# Patient Record
Sex: MALE | Race: BLACK OR AFRICAN AMERICAN | NOT HISPANIC OR LATINO | Employment: UNEMPLOYED | URBAN - METROPOLITAN AREA
[De-identification: names, ages, dates, MRNs, and addresses within clinical notes are randomized per-mention and may not be internally consistent; named-entity substitution may affect disease eponyms.]

---

## 2017-02-20 PROBLEM — M1A.9XX0 CHRONIC GOUT: Status: ACTIVE | Noted: 2017-02-20

## 2017-02-20 PROBLEM — N18.6 ESRD (END STAGE RENAL DISEASE): Status: ACTIVE | Noted: 2017-02-20

## 2017-02-20 PROBLEM — L03.211 FACIAL CELLULITIS: Status: ACTIVE | Noted: 2017-02-20

## 2017-02-20 PROBLEM — Z95.0 HISTORY OF PACEMAKER: Status: ACTIVE | Noted: 2017-02-20

## 2017-02-23 PROBLEM — N25.81 SECONDARY HYPERPARATHYROIDISM: Status: ACTIVE | Noted: 2017-02-23

## 2017-02-23 PROBLEM — Z91.199 NONCOMPLIANCE: Status: ACTIVE | Noted: 2017-02-23

## 2017-02-23 PROBLEM — F03.90 DEMENTIA: Status: ACTIVE | Noted: 2017-02-23

## 2017-02-25 PROBLEM — R41.0 DELIRIUM: Status: ACTIVE | Noted: 2017-02-25

## 2017-02-25 PROBLEM — N18.6 END STAGE RENAL DISEASE: Status: ACTIVE | Noted: 2017-02-25

## 2017-02-25 PROBLEM — Z91.89 AT RISK FOR INJURY: Status: ACTIVE | Noted: 2017-02-25

## 2018-05-14 ENCOUNTER — HOSPITAL ENCOUNTER (INPATIENT)
Facility: HOSPITAL | Age: 83
LOS: 6 days | Discharge: NURSING, REHAB OR LONG-TERM CARE | DRG: 177 | End: 2018-05-21
Attending: EMERGENCY MEDICINE | Admitting: INTERNAL MEDICINE
Payer: MEDICARE

## 2018-05-14 DIAGNOSIS — Z91.89 AT RISK FOR INJURY: ICD-10-CM

## 2018-05-14 DIAGNOSIS — Z95.0 HISTORY OF PACEMAKER: ICD-10-CM

## 2018-05-14 DIAGNOSIS — G30.9 ALZHEIMER'S DEMENTIA WITHOUT BEHAVIORAL DISTURBANCE, UNSPECIFIED TIMING OF DEMENTIA ONSET: ICD-10-CM

## 2018-05-14 DIAGNOSIS — Z91.199 NONCOMPLIANCE: ICD-10-CM

## 2018-05-14 DIAGNOSIS — N25.81 SECONDARY HYPERPARATHYROIDISM: ICD-10-CM

## 2018-05-14 DIAGNOSIS — N18.6 ESRD (END STAGE RENAL DISEASE): ICD-10-CM

## 2018-05-14 DIAGNOSIS — R41.0 CONFUSION: ICD-10-CM

## 2018-05-14 DIAGNOSIS — F02.80 ALZHEIMER'S DEMENTIA WITHOUT BEHAVIORAL DISTURBANCE, UNSPECIFIED TIMING OF DEMENTIA ONSET: ICD-10-CM

## 2018-05-14 DIAGNOSIS — N18.6 END STAGE RENAL DISEASE: Primary | ICD-10-CM

## 2018-05-14 DIAGNOSIS — R41.0 DELIRIUM: ICD-10-CM

## 2018-05-14 LAB
ALBUMIN SERPL BCP-MCNC: 3.5 G/DL
ALP SERPL-CCNC: 114 U/L
ALT SERPL W/O P-5'-P-CCNC: 20 U/L
AMMONIA PLAS-SCNC: 29 UMOL/L
ANION GAP SERPL CALC-SCNC: 16 MMOL/L
AST SERPL-CCNC: 13 U/L
BASOPHILS # BLD AUTO: 0 K/UL
BASOPHILS NFR BLD: 0.2 %
BILIRUB SERPL-MCNC: 0.6 MG/DL
BUN SERPL-MCNC: 56 MG/DL
CALCIUM SERPL-MCNC: 9.9 MG/DL
CHLORIDE SERPL-SCNC: 102 MMOL/L
CO2 SERPL-SCNC: 24 MMOL/L
CREAT SERPL-MCNC: 11.6 MG/DL
DIFFERENTIAL METHOD: ABNORMAL
EOSINOPHIL # BLD AUTO: 0.2 K/UL
EOSINOPHIL NFR BLD: 3.2 %
ERYTHROCYTE [DISTWIDTH] IN BLOOD BY AUTOMATED COUNT: 15.7 %
EST. GFR  (AFRICAN AMERICAN): 4 ML/MIN/1.73 M^2
EST. GFR  (NON AFRICAN AMERICAN): 4 ML/MIN/1.73 M^2
GLUCOSE SERPL-MCNC: 100 MG/DL
HCT VFR BLD AUTO: 31 %
HGB BLD-MCNC: 10.2 G/DL
LYMPHOCYTES # BLD AUTO: 1.8 K/UL
LYMPHOCYTES NFR BLD: 33.2 %
MCH RBC QN AUTO: 28.2 PG
MCHC RBC AUTO-ENTMCNC: 32.9 G/DL
MCV RBC AUTO: 86 FL
MONOCYTES # BLD AUTO: 0.7 K/UL
MONOCYTES NFR BLD: 13.8 %
NEUTROPHILS # BLD AUTO: 2.7 K/UL
NEUTROPHILS NFR BLD: 49.6 %
PLATELET # BLD AUTO: 171 K/UL
PMV BLD AUTO: 7.3 FL
POTASSIUM SERPL-SCNC: 5.1 MMOL/L
PROT SERPL-MCNC: 7.9 G/DL
RBC # BLD AUTO: 3.62 M/UL
SODIUM SERPL-SCNC: 142 MMOL/L
TSH SERPL DL<=0.005 MIU/L-ACNC: 1.03 UIU/ML
WBC # BLD AUTO: 5.4 K/UL

## 2018-05-14 PROCEDURE — 80053 COMPREHEN METABOLIC PANEL: CPT

## 2018-05-14 PROCEDURE — 93005 ELECTROCARDIOGRAM TRACING: CPT

## 2018-05-14 PROCEDURE — 82140 ASSAY OF AMMONIA: CPT

## 2018-05-14 PROCEDURE — 84443 ASSAY THYROID STIM HORMONE: CPT

## 2018-05-14 PROCEDURE — 85025 COMPLETE CBC W/AUTO DIFF WBC: CPT

## 2018-05-14 PROCEDURE — 87040 BLOOD CULTURE FOR BACTERIA: CPT

## 2018-05-14 PROCEDURE — 99219 PR INITIAL OBSERVATION CARE,LEVL II: CPT | Mod: AI,,, | Performed by: INTERNAL MEDICINE

## 2018-05-14 PROCEDURE — 93010 ELECTROCARDIOGRAM REPORT: CPT | Mod: ,,, | Performed by: INTERNAL MEDICINE

## 2018-05-14 PROCEDURE — 63600175 PHARM REV CODE 636 W HCPCS: Performed by: HOSPITALIST

## 2018-05-14 PROCEDURE — 99285 EMERGENCY DEPT VISIT HI MDM: CPT | Mod: 25

## 2018-05-14 PROCEDURE — 36415 COLL VENOUS BLD VENIPUNCTURE: CPT

## 2018-05-14 PROCEDURE — 25000003 PHARM REV CODE 250: Performed by: HOSPITALIST

## 2018-05-14 PROCEDURE — 94760 N-INVAS EAR/PLS OXIMETRY 1: CPT

## 2018-05-14 PROCEDURE — 12000002 HC ACUTE/MED SURGE SEMI-PRIVATE ROOM

## 2018-05-14 PROCEDURE — 63600175 PHARM REV CODE 636 W HCPCS: Performed by: INTERNAL MEDICINE

## 2018-05-14 RX ORDER — SEVELAMER CARBONATE 800 MG/1
800 TABLET, FILM COATED ORAL
Status: DISCONTINUED | OUTPATIENT
Start: 2018-05-15 | End: 2018-05-21 | Stop reason: HOSPADM

## 2018-05-14 RX ORDER — ONDANSETRON 2 MG/ML
4 INJECTION INTRAMUSCULAR; INTRAVENOUS EVERY 8 HOURS PRN
Status: DISCONTINUED | OUTPATIENT
Start: 2018-05-14 | End: 2018-05-21 | Stop reason: HOSPADM

## 2018-05-14 RX ORDER — AMLODIPINE BESYLATE 5 MG/1
5 TABLET ORAL DAILY
Status: ON HOLD | COMMUNITY
Start: 2018-05-08 | End: 2018-05-21 | Stop reason: HOSPADM

## 2018-05-14 RX ORDER — HEPARIN SODIUM 5000 [USP'U]/ML
5000 INJECTION, SOLUTION INTRAVENOUS; SUBCUTANEOUS EVERY 12 HOURS
Status: DISCONTINUED | OUTPATIENT
Start: 2018-05-14 | End: 2018-05-21 | Stop reason: HOSPADM

## 2018-05-14 RX ORDER — HYDRALAZINE HYDROCHLORIDE 20 MG/ML
10 INJECTION INTRAMUSCULAR; INTRAVENOUS EVERY 8 HOURS PRN
Status: DISCONTINUED | OUTPATIENT
Start: 2018-05-14 | End: 2018-05-21 | Stop reason: HOSPADM

## 2018-05-14 RX ORDER — AMOXICILLIN 250 MG
1 CAPSULE ORAL DAILY PRN
Status: DISCONTINUED | OUTPATIENT
Start: 2018-05-14 | End: 2018-05-21 | Stop reason: HOSPADM

## 2018-05-14 RX ORDER — AMLODIPINE BESYLATE 5 MG/1
5 TABLET ORAL DAILY
Status: DISCONTINUED | OUTPATIENT
Start: 2018-05-14 | End: 2018-05-19

## 2018-05-14 RX ORDER — ACETAMINOPHEN 325 MG/1
650 TABLET ORAL EVERY 6 HOURS PRN
Status: DISCONTINUED | OUTPATIENT
Start: 2018-05-14 | End: 2018-05-21 | Stop reason: HOSPADM

## 2018-05-14 RX ORDER — AMLODIPINE BESYLATE 5 MG/1
5 TABLET ORAL DAILY
Status: DISCONTINUED | OUTPATIENT
Start: 2018-05-15 | End: 2018-05-14

## 2018-05-14 RX ADMIN — HEPARIN SODIUM 5000 UNITS: 5000 INJECTION, SOLUTION INTRAVENOUS; SUBCUTANEOUS at 09:05

## 2018-05-14 RX ADMIN — HYDRALAZINE HYDROCHLORIDE 10 MG: 20 INJECTION INTRAMUSCULAR; INTRAVENOUS at 11:05

## 2018-05-14 RX ADMIN — AMLODIPINE BESYLATE 5 MG: 5 TABLET ORAL at 09:05

## 2018-05-14 NOTE — H&P
"PCP: Carlos Saucedo MD    History & Physical    Chief Complaint: Confusion    History of Present Illness:  Patient is a 84 y.o. male admitted to Hospitalist Service from [facility] Emergency Room with complaint of "confusion". Patient reportedly has past medical history significant for ESRD (on HD M/W/F), dementia and hypertension. Part of the history obtained from patient's son. He confirmed frequent son downing episodes as well. Patient lives alone. Patient presented with complaint of altered mental status. Patient received a call from his dialysis nurse after missing a treatment today. Nurse reportedly called EMS because patient informed her that he was too weak to get out of bed. Patient's last treatment was on Friday. Patient denied chest pain, shortness of breath, abdominal pain, nausea, vomiting, headache, vision changes, focal neuro-deficits, cough or fever. Patient is pleasantly confused. No acute distress.    Past Medical History:   Diagnosis Date    Renal disorder      History reviewed. No pertinent surgical history.  History reviewed. No pertinent family history.  Social History   Substance Use Topics    Smoking status: Never Smoker    Smokeless tobacco: Not on file    Alcohol use No      Review of patient's allergies indicates:  No Known Allergies    (Not in a hospital admission)  Review of Systems:  Constitutional: no fever or chills  Eyes: no visual changes  Ears, nose, mouth, throat, and face: no nasal congestion or sore throat  Respiratory: no cough or shorness of breath  Cardiovascular: no chest pain or palpitations  Gastrointestinal: no nausea or vomiting, no abdominal pain or change in bowel habits  Genitourinary: no hematuria or dysuria  Integument/breast: no rash or pruritis  Hematologic/lymphatic: no easy bruising or lymphadenopathy  Musculoskeletal: no arthralgias or myalgias  Neurological: no seizures or tremors.  Behavioral/Psych: + Confusion  Endocrine: no heat or cold " intolerance     OBJECTIVE:     Vital Signs (Most Recent)  Temp: 98 °F (36.7 °C) (05/14/18 1526)  Pulse: 78 (05/14/18 1702)  Resp: 18 (05/14/18 1526)  BP: (!) 180/88 (05/14/18 1702)  SpO2: 95 % (05/14/18 1702)    Physical Exam:  General appearance: well developed, appears stated age  Head: normocephalic, atraumatic  Eyes:  conjunctivae/corneas clear. PERRL.  Nose: Nares normal. Septum midline.  Throat: lips, mucosa, and tongue normal; teeth and gums normal, no throat erythema.  Neck: supple, symmetrical, trachea midline, no JVD and thyroid not enlarged, symmetric, no tenderness/mass/nodules  Lungs:  clear to auscultation bilaterally and normal respiratory effort  Chest wall: no tenderness. Right sided PM.  Heart: regular rate and rhythm, S1, S2 normal, no murmur, click, rub or gallop  Abdomen: soft, non-tender non-distented; bowel sounds normal; no masses,  no organomegaly  Extremities: no cyanosis, clubbing or edema. LUE AV fistula with good thrill.    Pulses: 2+ and symmetric  Skin: Skin color, texture, turgor normal. No rashes or lesions.  Lymph nodes: Cervical, supraclavicular, and axillary nodes normal.  Neurologic: Grossly non-focal. Pleasantly confused. Oriented to self only and brother.    Laboratory:   CBC:   Recent Labs  Lab 05/14/18  1618   WBC 5.40   RBC 3.62*   HGB 10.2*   HCT 31.0*      MCV 86   MCH 28.2   MCHC 32.9     CMP:   Recent Labs  Lab 05/14/18  1618      CALCIUM 9.9   ALBUMIN 3.5   PROT 7.9      K 5.1   CO2 24      BUN 56*   CREATININE 11.6*   ALKPHOS 114   ALT 20   AST 13   BILITOT 0.6     Microbiology Results (last 7 days)     Procedure Component Value Units Date/Time    Blood Culture #1 [[id number]]     Order Status:  Sent Specimen:  Blood     Blood Culture #2 [[id number]]     Order Status:  Sent Specimen:  Blood         Hemoglobin A1C   Date Value Ref Range Status   02/20/2017 5.2 0.0 - 5.6 % Final     Comment:     Reference Interval:  5.0 - 5.6 Normal   5.7 - 6.4  High Risk   > 6.5 Diabetic    Hgb A1c results are standardized based on the (NGSP) National   Glycohemoglobin Standardization Program.    Hemoglobin A1C levels are related to mean serum/plasma glucose   during the preceding 2-3 months.          Diagnostic Results:  Chest X-Ray: No acute process.  Cardiomegaly and right cardiac pacer.    CT head: No acute intracranial process.  No significant change.  Mild chronic white matter change.    Assessment/Plan:     Active Hospital Problems    Diagnosis  POA    *Confusion [R41.0]  Yes    Dementia [F03.90]  No obvious sign of infection.  Follow microbiology results.  CT head is without acute process. Clinical examination is non-focal for acute neurological deficits. Possibly metabolic encephalopathy.  Check ammonia level.  Check UA.  Observe fall precautions and neuro-checks.  PT to evaluate and treat in am.     Yes    Secondary hyperparathyroidism [N25.81]  Continue Phosphate binders and Sensipar. Continue renal diet.    Yes    ESRD (end stage renal disease) [N18.6]  Monitor BUN/SCr.  Monitor I/Os.  Monitor electrolytes.  Consult a Nephrology Service for HD.    Yes    History of pacemaker [Z95.0]  Tele-monitoring.    Not Applicable   Discussed with patient's brother and answered all the questions.     DVT prophylaxis: Heparin 5 K Sq q 12 hrs.    Pipo Brown MD  Department of Hospital Medicine   [facility]

## 2018-05-14 NOTE — ED PROVIDER NOTES
Encounter Date: 5/14/2018    SCRIBE #1 NOTE: I, Connie Lopez, am scribing for, and in the presence of, Dr. Barajas .       History     Chief Complaint   Patient presents with    Altered Mental Status     Missed dialysis today / possibly confused        05/14/2018 3:30 PM     Chief complaint: AMS       Roosevelt Gordon is a 84 y.o. male with a hx of renal disorder and Dementia who presents to the ED with complaints of AMS. Pt missed dialysis today. Nurse from the dialysis clinic sent someone to check on the pt who then sent him to the ED. Pt was reportedly confused at the time and states it is 1989. Pt c/o upper back pain. He is a poor historian due to confusion. NKDA noted.       The history is provided by the patient. History limited by: confused.     Review of patient's allergies indicates:  No Known Allergies  Past Medical History:   Diagnosis Date    Renal disorder      History reviewed. No pertinent surgical history.  History reviewed. No pertinent family history.  Social History   Substance Use Topics    Smoking status: Never Smoker    Smokeless tobacco: Not on file    Alcohol use No     Review of Systems   Unable to perform ROS: Mental status change   Psychiatric/Behavioral: Positive for confusion.   All other systems reviewed and are negative.      Physical Exam     Initial Vitals [05/14/18 1526]   BP Pulse Resp Temp SpO2   (!) 169/83 78 18 98 °F (36.7 °C) (!) 94 %      MAP       111.67         Physical Exam    Nursing note and vitals reviewed.  Constitutional: He appears well-developed and well-nourished. He is not diaphoretic.  Non-toxic appearance. He does not have a sickly appearance. He does not appear ill. No distress.   Pt is pleasant and cooperative.    HENT:   Head: Normocephalic and atraumatic.   Eyes: EOM are normal.   Neck: Normal range of motion. Neck supple. Normal range of motion present. No neck rigidity.   Cardiovascular: Normal rate, regular rhythm and normal heart sounds. Exam reveals  no gallop and no friction rub.    No murmur heard.  Pulmonary/Chest: Breath sounds normal. No respiratory distress. He has no wheezes. He has no rhonchi. He has no rales.   Right sided pacemaker.    Musculoskeletal: Normal range of motion.   LUQ AV fistula with good thrill.    Neurological: He is alert.   Pt is not oriented to date or situation.    Skin: Skin is warm and dry. No rash noted.   Psychiatric: He has a normal mood and affect. His behavior is normal. Judgment and thought content normal.         ED Course   Procedures  Labs Reviewed - No data to display          Medical Decision Making:   History:   Old Medical Records: I decided to obtain old medical records.  Old Records Summarized: records from clinic visits.  Clinical Tests:   Lab Tests: Ordered and Reviewed  Radiological Study: Ordered and Reviewed  Medical Tests: Ordered and Reviewed  ED Management:  3:35 pm  Called dialysis clinic in [place] and spoke to Angelica Sutton RN. Pt showed up late 3 days ago and only received 1.5 hours of dialysis. He did not show up today due to c/o too weak to walk. Over the phone, pt seemed more confused than usual. He is typically confused although he drives himself to dialysis.     Imaging Results          CT Head Without Contrast (Final result)  Result time 05/14/18 16:15:34    Final result by Roosevelt Pavon MD (05/14/18 16:15:34)                 Impression:      No acute intracranial process.  No significant change.  Mild chronic white matter change.      Electronically signed by: Roosevelt Pavon MD  Date:    05/14/2018  Time:    16:15             Narrative:    EXAMINATION:  CT HEAD WITHOUT CONTRAST    CLINICAL HISTORY:  Confusion/delirium, altered LOC, unexplained;    TECHNIQUE:  Low dose axial images were obtained through the head.  Coronal and sagittal reformations were also performed. Contrast was not administered.    COMPARISON:  07/21/2017    FINDINGS:  Gray-white differentiation is well maintained.   There is no intracranial hemorrhage.  No mass or mass effect.  There is moderate generalized cerebral volume loss with compensatory ventricular enlargement.  Mild periventricular white matter hypoattenuation is present which is nonspecific however unchanged.  Heavy calcification of the intracranial ICAs is present.  The calvarium is intact.                               X-Ray Chest AP Portable (Final result)  Result time 05/14/18 15:59:19    Final result by Roosevelt Pavon MD (05/14/18 15:59:19)                 Impression:      No acute process.  Cardiomegaly and right cardiac pacer.      Electronically signed by: Roosevelt Pavon MD  Date:    05/14/2018  Time:    15:59             Narrative:    EXAMINATION:  XR CHEST AP PORTABLE    CLINICAL HISTORY:  confusion;    TECHNIQUE:  Single frontal view of the chest was performed.    COMPARISON:  None    FINDINGS:  The heart is moderately enlarged.  There is a right-sided dual lead cardiac pacer.  No consolidation or pleural effusion.  A left subclavian stent is present.                                      Scribe Attestation:   Scribe #1: I performed the above scribed service and the documentation accurately describes the services I performed. I attest to the accuracy of the note.    I, Dr. Barajas, personally performed the services described in this documentation. All medical record entries made by the scribe were at my direction and in my presence.  I have reviewed the chart and agree that the record reflects my personal performance and is accurate and complete.7:00 PM 05/14/2018            ED Course as of May 14 1857   Mon May 14, 2018   1545 Dialysis nurse reports the patient told her that he could not walk earlier today but the patient denies ever telling anyone this.  [EF]   1549 BP: (!) 169/83 [EF]   1549 Temp: 98 °F (36.7 °C) [EF]   1549 Temp src: Oral [EF]   1549 Pulse: 78 [EF]   1549 Resp: 18 [EF]   1549 SpO2: (!) 94 % [EF]   1713 Brother on the way to the  ER  [EF]   1758 Patient's brother arrives who reports that he is confused from his baseline.  Patient will need to be admitted to the hospital for confusion.  [EF]   1801 Case discussed with Dr. Brown of [facility] who will admit the patient for confusion.  [EF]   1809 TSH ammonia blood cultures added on per Dr. Brown request.  [EF]      ED Course User Index  [EF] Juan Barajas MD     Clinical Impression:     1. Confusion            84-year-old with a history of dementia and dialysis presents to the ER for increasing confusion from baseline.  Patient is unable to give any history.  History obtained from dialysis clinic who states the patient sounded confused over the phone so they called an ambulance for him.  In the emergency room the patient is confused to location and the year.  The patient's brother reports that his confusion is worse than it usually is.  Uncertain etiology at this time although uremia is a consideration since he has missed a dialysis session.  No fever or neck stiffness to suggest meningitis.  No apparent source of infection at this time.      Juan Barajas MD  05/14/18 [id number]

## 2018-05-14 NOTE — ED NOTES
"Presents to the ER with c/o via EMS for altered mental status. Patient received a call from his dialysis nurse after missing a treatment today. Nurse reports calling EMS because patient informed her that he was too weak to get out of bed. Patient's last treatment was on Friday. Patient is AAO to self and place. He states the year is "2028 and is unsure why he is here." Mucous membranes are pink and moist. Skin is warm, dry and intact. AV fistula to left upper arm with + thrill and bruit."  Lungs are clear bilaterally, respirations are regular and unlabored. BS active x4, patient does not appear to have any  tenderness with palpation, abd is soft and not distended. S1S2, capillary refill is < 2 seconds. NAND VSS    Unable to perform ROS secondary to altered mental status.     "

## 2018-05-14 NOTE — ED NOTES
Phlebotomist is at the bedside to obtain second blood culture. No needs or questions at this time from patient.

## 2018-05-15 LAB
ANION GAP SERPL CALC-SCNC: 20 MMOL/L
BASOPHILS # BLD AUTO: 0 K/UL
BASOPHILS NFR BLD: 0.2 %
BUN SERPL-MCNC: 67 MG/DL
CALCIUM SERPL-MCNC: 9.9 MG/DL
CHLORIDE SERPL-SCNC: 101 MMOL/L
CO2 SERPL-SCNC: 21 MMOL/L
CREAT SERPL-MCNC: 12.7 MG/DL
DIFFERENTIAL METHOD: ABNORMAL
EOSINOPHIL # BLD AUTO: 0 K/UL
EOSINOPHIL NFR BLD: 0.5 %
ERYTHROCYTE [DISTWIDTH] IN BLOOD BY AUTOMATED COUNT: 15.7 %
EST. GFR  (AFRICAN AMERICAN): 4 ML/MIN/1.73 M^2
EST. GFR  (NON AFRICAN AMERICAN): 3 ML/MIN/1.73 M^2
GLUCOSE SERPL-MCNC: 107 MG/DL
HCT VFR BLD AUTO: 31.1 %
HGB BLD-MCNC: 10.4 G/DL
LYMPHOCYTES # BLD AUTO: 0.7 K/UL
LYMPHOCYTES NFR BLD: 15.7 %
MAGNESIUM SERPL-MCNC: 2.6 MG/DL
MCH RBC QN AUTO: 28.3 PG
MCHC RBC AUTO-ENTMCNC: 33.4 G/DL
MCV RBC AUTO: 85 FL
MONOCYTES # BLD AUTO: 0.5 K/UL
MONOCYTES NFR BLD: 12.7 %
NEUTROPHILS # BLD AUTO: 3 K/UL
NEUTROPHILS NFR BLD: 70.9 %
PHOSPHATE SERPL-MCNC: 6.6 MG/DL
PLATELET # BLD AUTO: 183 K/UL
PMV BLD AUTO: 7.4 FL
POTASSIUM SERPL-SCNC: 5.5 MMOL/L
RBC # BLD AUTO: 3.66 M/UL
SODIUM SERPL-SCNC: 142 MMOL/L
WBC # BLD AUTO: 4.2 K/UL

## 2018-05-15 PROCEDURE — 84100 ASSAY OF PHOSPHORUS: CPT

## 2018-05-15 PROCEDURE — 80048 BASIC METABOLIC PNL TOTAL CA: CPT

## 2018-05-15 PROCEDURE — 80074 ACUTE HEPATITIS PANEL: CPT

## 2018-05-15 PROCEDURE — G8996 SWALLOW CURRENT STATUS: HCPCS | Mod: CN

## 2018-05-15 PROCEDURE — 25000003 PHARM REV CODE 250: Performed by: HOSPITALIST

## 2018-05-15 PROCEDURE — 85025 COMPLETE CBC W/AUTO DIFF WBC: CPT

## 2018-05-15 PROCEDURE — 99226 PR SUBSEQUENT OBSERVATION CARE,LEVEL III: CPT | Mod: ,,, | Performed by: INTERNAL MEDICINE

## 2018-05-15 PROCEDURE — 36415 COLL VENOUS BLD VENIPUNCTURE: CPT

## 2018-05-15 PROCEDURE — 25000003 PHARM REV CODE 250: Performed by: EMERGENCY MEDICINE

## 2018-05-15 PROCEDURE — 83735 ASSAY OF MAGNESIUM: CPT

## 2018-05-15 PROCEDURE — 63600175 PHARM REV CODE 636 W HCPCS: Performed by: INTERNAL MEDICINE

## 2018-05-15 PROCEDURE — 94761 N-INVAS EAR/PLS OXIMETRY MLT: CPT

## 2018-05-15 PROCEDURE — 12000002 HC ACUTE/MED SURGE SEMI-PRIVATE ROOM

## 2018-05-15 PROCEDURE — 92610 EVALUATE SWALLOWING FUNCTION: CPT

## 2018-05-15 PROCEDURE — G0378 HOSPITAL OBSERVATION PER HR: HCPCS

## 2018-05-15 PROCEDURE — 63600175 PHARM REV CODE 636 W HCPCS: Performed by: HOSPITALIST

## 2018-05-15 PROCEDURE — G8997 SWALLOW GOAL STATUS: HCPCS | Mod: CK

## 2018-05-15 RX ORDER — HYDRALAZINE HYDROCHLORIDE 20 MG/ML
10 INJECTION INTRAMUSCULAR; INTRAVENOUS ONCE
Status: COMPLETED | OUTPATIENT
Start: 2018-05-15 | End: 2018-05-15

## 2018-05-15 RX ORDER — CINACALCET 30 MG/1
30 TABLET, FILM COATED ORAL
Status: DISCONTINUED | OUTPATIENT
Start: 2018-05-16 | End: 2018-05-21 | Stop reason: HOSPADM

## 2018-05-15 RX ADMIN — AMLODIPINE BESYLATE 5 MG: 5 TABLET ORAL at 08:05

## 2018-05-15 RX ADMIN — HYDRALAZINE HYDROCHLORIDE 10 MG: 20 INJECTION INTRAMUSCULAR; INTRAVENOUS at 03:05

## 2018-05-15 RX ADMIN — HEPARIN SODIUM 5000 UNITS: 5000 INJECTION, SOLUTION INTRAVENOUS; SUBCUTANEOUS at 09:05

## 2018-05-15 RX ADMIN — HEPARIN SODIUM 5000 UNITS: 5000 INJECTION, SOLUTION INTRAVENOUS; SUBCUTANEOUS at 08:05

## 2018-05-15 RX ADMIN — SEVELAMER CARBONATE 800 MG: 800 TABLET, FILM COATED ORAL at 08:05

## 2018-05-15 NOTE — PLAN OF CARE
"Met with pt's brother Judd who was at pt's bedside and provided the information for the assessment.  Pt was awake however not participating in the conversation or responding when his brother asked him a question.  Educated the brother on the blue discharge folder and left the folder in the room.  Pt, who is single with no children, lives alone and is independent with his self care.  Pt drives and ambulates with a standard cane.  Pt's brother thinks pt receives home health services however does not know the agency and states pt is "on the road" a lot and not always home when home health goes to see pt.  Pt drives himself to his 10:30 a.m. dialysis on MWF at [facility] in [place].  Pt's PCP is listed as Dr. Saucedo although Judd believes pt also sees another physician.  Pt's primary insurance is Medicare.  Judd is uncertain if pt has a secondary insurance however he would check on it and call us back with an update.  Discussed pt's discharge disposition with pt's brother Judd who stated he was willing for pt to discharge home with him to [address], [place] [address] if upon discharge pt is unable to care for himself.      05/15/18 1510   Discharge Assessment   Assessment Type Discharge Planning Assessment   Confirmed/corrected address and phone number on facesheet? Yes   Assessment information obtained from? Other  (Pt's brother Judd who was at bedside.)   Prior to hospitilization cognitive status: Unable to Assess   Prior to hospitalization functional status: Assistive Equipment;Independent   Current cognitive status: Unable to Assess   Current Functional Status: Completely Dependent   Lives With alone   Able to Return to Prior Arrangements unable to determine at this time (comments)   Is patient able to care for self after discharge? Unable to determine at this time (comments)   Who are your caregiver(s) and their phone number(s)? (brother Judd Gordon [phone])   Patient's perception of discharge " "disposition other (comments)  (Pt unable to repond at this time. )   Patient currently being followed by outpatient case management? Unable to determine (comments)   Patient currently receives any other outside agency services? (Pt's brother Judd states he thinks pt has home health however he does not know the agency and states that pt is "on the road a lot and sometimes misses the nurse.")   Equipment Currently Used at Home cane, straight   Do you have any problems affording any of your prescribed medications? No  (plan for brother to call back with name of pharmacy)   Is the patient taking medications as prescribed? yes   Does the patient have transportation home? Yes   Transportation Available family or friend will provide   Dialysis Name and Scheduled days ([facility] in [place] on MWF at 10:30 per pts' brother)   Does the patient receive services at the Coumadin Clinic? No   Discharge Plan A Home with family   Discharge Plan B Home Health   Patient/Family In Agreement With Plan yes     "

## 2018-05-15 NOTE — PLAN OF CARE
I attempted to complete the discharge assessment however the pt has dementia and has a sitter at bedside. According to nursing notes, his brother will be here today. CM will return to follow up with family. Liliana Burns LMSW     05/15/18 1135   Discharge Assessment   Assessment Type Discharge Planning Assessment

## 2018-05-15 NOTE — NURSING
Call placed to Dr. Redman. Pt BP on arrival to floor 183/94 (134). Orders to give amlodipine now and restart jeet morning. Prn hydralazine ordered.

## 2018-05-15 NOTE — CONSULTS
INPATIENT NEPHROLOGY CONSULT   [facility] NEPHROLOGY    Patient Name: Roosevelt Gordon  Date: 05/15/2018    Reason for consultation: ESRD    Chief Complaint:   Chief Complaint   Patient presents with    Altered Mental Status     Missed dialysis today / possibly confused        History of Present Illness:  83 y/o M with ESRD on HD MWF, HTN and dementia who p/w AMS. He missed HD on Monday. Nurse at HD unit asked someone to check on him and he was noted to be confused and was sent to the ER. There are no reported exac/reliev factors. We have been consulted for dialysis.     He is asleep with a sitter. She says he has been confused. He doesn't arouse to my voice. He was made NPO after a swallow study. This is not his baseline.     Plan of Care:    Assessment:  AMS  ESRD  HTN  Hyperkalemia  SHPT  Anemia of CKD    Plan:    - Ordered HD for today and tomorrow. Will see if mental status improves after dialysis (uremia). Head CT was negative. Infx workup has been negative thus far.   - BP is high. CXR is negative for significant pulm edema. Resume home BP meds. Ordered 3-4L UF.  - Ordered 2K bath. He is NPO.  - Phos is elevated. Resumed sevelamer and sensipar.  - Anemia is stable. Ordered EPO with HD.     Thank you for allowing us to participate in this patient's care. We will continue to follow.    Vital Signs:  Temp Readings from Last 3 Encounters:   05/15/18 98.2 °F (36.8 °C) (Oral)   05/11/18 98.1 °F (36.7 °C) (Oral)   03/29/17 98.3 °F (36.8 °C) (Oral)       Pulse Readings from Last 3 Encounters:   05/15/18 76   05/11/18 79   07/21/17 81       BP Readings from Last 3 Encounters:   05/15/18 (!) 160/79   05/11/18 127/83   07/21/17 (!) 140/74       Weight:  Wt Readings from Last 3 Encounters:   05/14/18 70.2 kg (154 lb 12.2 oz)   05/11/18 70.3 kg (155 lb)   03/29/17 70.3 kg (154 lb 15.7 oz)       Past Medical & Surgical History:  Past Medical History:   Diagnosis Date    Renal disorder        Past Surgical History:    Procedure Laterality Date    CARDIAC PACEMAKER PLACEMENT         Past Social History:  Social History     Social History    Marital status: Single     Spouse name: N/A    Number of children: N/A    Years of education: N/A     Social History Main Topics    Smoking status: Never Smoker    Smokeless tobacco: None    Alcohol use No    Drug use: No    Sexual activity: Not Currently     Other Topics Concern    None     Social History Narrative    None       Medications:  No current facility-administered medications on file prior to encounter.      Current Outpatient Prescriptions on File Prior to Encounter   Medication Sig Dispense Refill    acetaminophen (TYLENOL) 325 MG tablet Take 2 tablets (650 mg total) by mouth every 8 (eight) hours as needed for Pain or Temperature greater than (100.3).  0    allopurinol (ZYLOPRIM) 100 MG tablet Take 100 mg by mouth once daily.      aspirin 325 MG tablet Take 325 mg by mouth once daily.      bisacodyl (DULCOLAX) 5 mg EC tablet Take 5 mg by mouth daily as needed for Constipation.      cinacalcet (SENSIPAR) 30 MG Tab Take 30 mg by mouth daily with breakfast.      diphenhydrAMINE (BENADRYL) 25 mg capsule Take 1 each (25 mg total) by mouth every 6 (six) hours as needed for Itching.  0    famotidine (PEPCID) 20 MG tablet Take 20 mg by mouth 2 (two) times daily.      furosemide (LASIX) 20 MG tablet Take 20 mg by mouth 2 (two) times daily.      meclizine (ANTIVERT) 32 MG tablet Take 25 mg by mouth 3 (three) times daily as needed. 1/2 tablet three times daily prn      nystatin (MYCOSTATIN) ointment Apply topically 2 (two) times daily. 15 g 1    sevelamer carbonate (RENVELA) 800 mg Tab Take 800 mg by mouth 3 (three) times daily with meals. 3 tablets with each meal      tamsulosin (FLOMAX) 0.4 mg Cp24 Take 0.4 mg by mouth once daily.       Scheduled Meds:   amLODIPine  5 mg Oral Daily    [START ON 5/16/2018] cinacalcet  30 mg Oral Daily with breakfast    [START ON  5/16/2018] epoetin pinky (PROCRIT) injection  5,000 Units Intravenous Every Mon, Wed, Fri    heparin (porcine)  5,000 Units Subcutaneous Q12H    sevelamer carbonate  800 mg Oral TID WM     Continuous Infusions:  PRN Meds:.acetaminophen, hydrALAZINE, ondansetron, senna-docusate 8.6-50 mg    Allergies:  Patient has no known allergies.    Past Family History:  Unable to obtain due to AMS    Review of Systems:  Unable to obtain due to AMS    Physical Exam:  General Appearance:    NAD, AAO x 0, appears stated age   Head:    Normocephalic, atraumatic   Eyes:    Eyes closed      Throat:   Moist mucus membranes   Lungs:     Clear to auscultation bilaterally, no wheezes, crackles, rales    or rhonchi, symmetric air movement, respirations unlabored   Chest wall:    No deformity   Heart:    Regular rate and rhythm, S1 and S2 normal, no murmur, rub   or gallop   Abdomen:     Soft, non-tender, non-distended, bowel sounds active all four   quadrants, no RT or guarding, no masses, no organomegaly   Extremities:   Warm and well perfused, distal pulses are intact, no               cyanosis or peripheral edema   MSK:   No joint or muscle swelling, tenderness or deformity. +     muscle atrophy.   Skin:   Skin color, texture, turgor normal, no rashes or lesions   Neurologic/Psychiatric:   Unable to assess due to AMS.     Results:  Lab Results   Component Value Date     05/15/2018    K 5.5 (H) 05/15/2018     05/15/2018    CO2 21 (L) 05/15/2018    BUN 67 (H) 05/15/2018    CREATININE 12.7 (H) 05/15/2018    CALCIUM 9.9 05/15/2018    ANIONGAP 20 (H) 05/15/2018    ESTGFRAFRICA 4 (A) 05/15/2018    EGFRNONAA 3 (A) 05/15/2018       Lab Results   Component Value Date    CALCIUM 9.9 05/15/2018    PHOS 6.6 (H) 05/15/2018         Recent Labs  Lab 05/15/18  0457   WBC 4.20   RBC 3.66*   HGB 10.4*   HCT 31.1*      MCV 85   MCH 28.3   MCHC 33.4       I have personally reviewed pertinent radiological imaging and reports.    Chad  MD Giselle MPH  [facility] [place]  [address]  [place] [address]  [phone] (p)  [phone] (f)

## 2018-05-15 NOTE — ED NOTES
Upon transfer to room 303, patient is AAO to self and place. No cardiac or respiratory complications. No needs or questions at this time.

## 2018-05-15 NOTE — NURSING
Pt BP unresolved with hydralazine. Call placed to Dr. Redman. Orders for x1 dose of hydralazine 10 mg now. Will admin and cont to monitor.

## 2018-05-15 NOTE — PROGRESS NOTES
05/15/18 0720   Patient Assessment/Suction   Level of Consciousness (AVPU) alert   Expansion/Accessory Muscles/Retractions expansion symmetric   PRE-TX-O2-ETCO2   O2 Device (Oxygen Therapy) room air   SpO2 96 %   Pulse Oximetry Type Intermittent   $ Pulse Oximetry - Multiple Charge Pulse Oximetry - Multiple   Pulse 77   Resp 18

## 2018-05-15 NOTE — PROGRESS NOTES
"Progress Note  Hospital Medicine  Patient Name:Roosevelt Gordon  MRN:  [id number]  Patient Class: IP- Inpatient  Admit Date: 5/14/2018  Length of Stay: 1 days  Expected Discharge Date:   Attending Physician: Pipo Brown MD  Primary Care Provider:  Carlos Saucedo MD    SUBJECTIVE:     Principal Problem: Confusion  Initial history of present illness: Patient is a 84 y.o. male admitted to Hospitalist Service from [facility] Emergency Room with complaint of "confusion". Patient reportedly has past medical history significant for ESRD (on HD M/W/F), dementia and hypertension. Part of the history obtained from patient's son. He confirmed frequent son downing episodes as well. Patient lives alone. Patient presented with complaint of altered mental status. Patient received a call from his dialysis nurse after missing a treatment today. Nurse reportedly called EMS because patient informed her that he was too weak to get out of bed. Patient's last treatment was on Friday. Patient denied chest pain, shortness of breath, abdominal pain, nausea, vomiting, headache, vision changes, focal neuro-deficits, cough or fever. Patient is pleasantly confused. No acute distress.    PMH/PSH/SH/FH/Meds: reviewed.    Symptoms/Review of Systems:  Patient is again confused, gurgling. Oriented to self only. Coughing. No shortness of breath, chest pain or headache, fever or abdominal pain.     Diet: NPO  Activity level: Up with assistance  Pain:  Patient reports no pain.       OBJECTIVE:   Vital Signs (Most Recent):      Temp: 98.2 °F (36.8 °C) (05/15/18 0723)  Pulse: 76 (05/15/18 0800)  Resp: 20 (05/15/18 0723)  BP: (!) 160/79 (05/15/18 0723)  SpO2: 96 % (05/15/18 0723)       Vital Signs Range (Last 24H):  Temp:  [97 °F (36.1 °C)-98.2 °F (36.8 °C)]   Pulse:  [76-82]   Resp:  [18-20]   BP: (160-198)/(71-94)   SpO2:  [94 %-96 %]     Weight: 70.2 kg (154 lb 12.2 oz)  Body mass index is 22.21 kg/m².    Intake/Output Summary (Last 24 hours) at " 05/15/18 0933  Last data filed at 05/15/18 0600   Gross per 24 hour   Intake                0 ml   Output                0 ml   Net                0 ml     Physical Examination:  General appearance: well developed, appears stated age  Head: normocephalic, atraumatic  Eyes:  conjunctivae/corneas clear. PERRL.  Nose: Nares normal. Septum midline.  Throat: lips, mucosa, and tongue normal; teeth and gums normal, no throat erythema.  Neck: supple, symmetrical, trachea midline, no JVD and thyroid not enlarged, symmetric, no tenderness/mass/nodules  Lungs:  clear to auscultation bilaterally and normal respiratory effort  Chest wall: no tenderness. Right sided PM.  Heart: regular rate and rhythm, S1, S2 normal, no murmur, click, rub or gallop  Abdomen: soft, non-tender non-distented; bowel sounds normal; no masses,  no organomegaly  Extremities: no cyanosis, clubbing or edema. LUE AV fistula with good thrill.    Pulses: 2+ and symmetric  Skin: Skin color, texture, turgor normal. No rashes or lesions.  Lymph nodes: Cervical, supraclavicular, and axillary nodes normal.  Neurologic: Grossly non-focal. Pleasantly confused. Oriented to self only and brother.    CBC:    Recent Labs  Lab 05/14/18 1618 05/15/18  0457   WBC 5.40 4.20   RBC 3.62* 3.66*   HGB 10.2* 10.4*   HCT 31.0* 31.1*    183   MCV 86 85   MCH 28.2 28.3   MCHC 32.9 33.4   BMP    Recent Labs  Lab 05/14/18  1618 05/15/18  0457    107    142   K 5.1 5.5*    101   CO2 24 21*   BUN 56* 67*   CREATININE 11.6* 12.7*   CALCIUM 9.9 9.9   MG  --  2.6      Diagnostic Results:  Microbiology Results (last 7 days)     Procedure Component Value Units Date/Time    Blood Culture #1 [[id number]] Collected:  05/14/18 1845    Order Status:  Completed Specimen:  Blood from Antecubital, Left Updated:  05/15/18 0545     Blood Culture, Routine No Growth to date    Blood Culture #2 [[id number]] Collected:  05/14/18 1901    Order Status:  Completed Specimen:  Blood  from Antecubital, Right Updated:  05/15/18 0545     Blood Culture, Routine No Growth to date         Chest X-Ray: No acute process.  Cardiomegaly and right cardiac pacer.     CT head: No acute intracranial process.  No significant change.  Mild chronic white matter change.    Assessment/Plan:      *Confusion [R41.0]   Yes    Dementia [F03.90]  No obvious sign of infection.  Follow microbiology results.  CT head is without acute process. Clinical examination is non-focal for acute neurological deficits. Possibly metabolic encephalopathy.  Observe fall precautions and neuro-checks.  Physical therapy.      Yes    Secondary hyperparathyroidism [N25.81]  Continue Phosphate binders and Sensipar. Continue renal diet.    Swallow dysfunction  Consult ST to evaluate swallow. Observe aspiration precaution.  Needs frequent suctioning.  Start IV Zosyn for possible aspiration pneumonitis.      Yes    ESRD (end stage renal disease) [N18.6]  Monitor BUN/SCr. HD as per nephrology.  Monitor I/Os.  Monitor electrolytes.    Hyperkalemia  Getting HD. Follow telemetry.      Yes    History of pacemaker [Z95.0]  Tele-monitoring.      Not Applicable   Discussed with patient's brother and answered all the questions.      DVT prophylaxis: Heparin 5 K Sq q 12 hrs.    VTE Risk Mitigation         Ordered     heparin (porcine) injection 5,000 Units  Every 12 hours      05/14/18 2047     IP VTE HIGH RISK PATIENT  Once      05/14/18 2047     Place MITA hose  Until discontinued      05/14/18 2047        Pipo Brown MD  Department of Hospital Medicine   [facility]

## 2018-05-15 NOTE — PLAN OF CARE
Problem: Patient Care Overview  Goal: Plan of Care Review  Patient remains free from fall or injury.  Sitter remains at bedside throughout shift.  Patient oriented to self only.  NPO per SLP rec.   and sowmya  visited patient this shift.  Dialysis ordered and patient in dialysis at this time.   IV antibiotics to be initiated after HD.  Patient assisted to turn, and patient rounds performed every 2 hours this shift.  Plan of care discussed with Judd feldman.

## 2018-05-15 NOTE — UM SECONDARY REVIEW
Physician Advisor External    Level of Care Issue    Per Dr. Quesada at [facility], pt is OP appropriate for 5/14/2018.  Level of care discussed with Dr. Brown.  OBS order received.  CC44 completed.    Unable to complete MOON notification, pt admitted with confusion and AMS, oriented only to self.  Witness signature obtained.

## 2018-05-15 NOTE — PT/OT/SLP EVAL
"Speech Language Pathology Evaluation  Bedside Swallow    Patient Name:  Roosevelt Gordon   MRN:  [id number]  Admitting Diagnosis: Confusion    Recommendations:                 General Recommendations:  Ongoing swallow assessment  Diet recommendations:  NPO, NPO   Aspiration Precautions: Frequent oral care   General Precautions: Standard, fall  Communication strategies:  provide increased time to answer and go to room if call light pushed    History:     Past Medical History:   Diagnosis Date    Renal disorder        Past Surgical History:   Procedure Laterality Date    CARDIAC PACEMAKER PLACEMENT         Social History: Per chart, pt lives alone (?).    Prior Intubation HX:  None this admit    Modified Barium Swallow: none    Chest X-Rays: "No acute process.  Cardiomegaly and right cardiac pacer"    CT head: "No acute intracranial process.  No significant change.  Mild chronic white matter change."    Prior diet: Presumed regular. Pt unable to provide any hx. No family at bedside.     Occupation/hobbies/homemaking: unknown.    Subjective     Awake with open mouth posture. Poor eye contact. Non interactive with flat affect. Limited ability to follow basic commands. Does not respond to most questions. Poor speech intelligibility.        Objective:     Pt seen for clinical swallowing evaluation. Nsg reports bolus holding with meds and breakfast this AM.     Oral Musculature Evaluation  · Oral Musculature: unable to assess due to poor participation/comprehension  · Dentition: present and adequate (missing upper and lower molars)  · Secretion Management: adequate  · Mucosal Quality: dry  · Oral Labial Strength and Mobility: impaired pursing, impaired retraction  · Volitional Cough: unable to elicit  · Volitional Swallow: able to palpate laryngeal movment    Bedside Swallow Eval:   Consistencies Assessed:  · Thin liquids ice chips x2     Oral Phase:   · Poor lip closure, Very limited manipulation of bolus, poor awareness of " bolus in oral cavity  · Leakage, mouth breathing    Pharyngeal Phase:   · required TTS to faucial pillars to initaite pharyngeal swallow    Treatment: Pt/family educated re: results/recs of evaluation, SLP role and POC. Pt did not demonstrate understanding of information provide and would benefit from repeated education.     Assessment:     Roosevelt Gordon is a 84 y.o. male with an SLP diagnosis of Dysphagia.  He presents with poor ability to follow simple commands, poor speech intelligibility and open mouth posture. Limited clinical swallow evaluation completed. Ice chip trials x2 revealed poor lip closure, very limited manipulation of bolus, poor awareness of bolus in mouth. PT required TTS to faucial pillars to elicit pharyngeal swallow (open mouthed). Pt judged to be at high risk for aspiration at this time. Further PO trials deferred. REC NPO, including medication. Will follow for ongoing swallow assessment,with goal for PO diet, once mental status improves.     Goals:    SLP Goals        Problem: SLP Goal    Goal Priority Disciplines Outcome   SLP Goal     SLP    Description:    1) Pt will participate in ongoing swallow evaluation  2) Consume mechanical soft textures and thin liquids with functional oral phase and no overt s/s penetration/aspiration                    Plan:     · Patient to be seen:  5 x/week   · Plan of Care expires:  05/22/18  · Plan of Care reviewed with:      · SLP Follow-Up:  Yes       Time Tracking:     SLP Treatment Date:   05/15/18  Speech Start Time:  0955  Speech Stop Time:  1008     Speech Total Time (min):  13 min    Billable Minutes: Eval Swallow and Oral Function 13 and Total Time 13    Yudith Mario CCC-SLP  05/15/2018

## 2018-05-15 NOTE — NURSING
Attempt to call brother bill to complete admission. No answer. Per ED brother will be coming to hospital in am.

## 2018-05-15 NOTE — PLAN OF CARE
Problem: Patient Care Overview  Goal: Plan of Care Review  Outcome: Ongoing (interventions implemented as appropriate)  Pt admitted from ED with confusion. AA Oriented to self. No complaints of pain. Admin hydralazine for increased BP. Teds and tele initiated. Consult to Nephrology to be called in am. Pt slept well through the night. Talking in sleep. Q2 hour rounding utilized. Pain and comfort assessed. Bed low, brakes locked, SR up, call light within reach. POC reviewed. Needs reinforcement. Will continue to monitor.

## 2018-05-15 NOTE — PLAN OF CARE
Problem: SLP Goal  Goal: SLP Goal    1) Pt will participate in ongoing swallow evaluation  2) Consume mechanical soft textures and thin liquids with functional oral phase and no overt s/s penetration/aspiration      Clinical swallow evaluation completed. REC NPO, including meds. Will follow for ongoing swallow assessment with goal for initiation of PO diet.     Yudith Mario MS, CCC/SLP 5/15/2018

## 2018-05-16 LAB
ANION GAP SERPL CALC-SCNC: 16 MMOL/L
BASOPHILS # BLD AUTO: 0 K/UL
BASOPHILS NFR BLD: 0.2 %
BUN SERPL-MCNC: 39 MG/DL
CALCIUM SERPL-MCNC: 10.3 MG/DL
CHLORIDE SERPL-SCNC: 101 MMOL/L
CO2 SERPL-SCNC: 22 MMOL/L
CREAT SERPL-MCNC: 8.5 MG/DL
DIFFERENTIAL METHOD: ABNORMAL
EOSINOPHIL # BLD AUTO: 0 K/UL
EOSINOPHIL NFR BLD: 0.6 %
ERYTHROCYTE [DISTWIDTH] IN BLOOD BY AUTOMATED COUNT: 16 %
EST. GFR  (AFRICAN AMERICAN): 6 ML/MIN/1.73 M^2
EST. GFR  (NON AFRICAN AMERICAN): 5 ML/MIN/1.73 M^2
GLUCOSE SERPL-MCNC: 101 MG/DL
HAV IGM SERPL QL IA: NEGATIVE
HBV CORE IGM SERPL QL IA: NEGATIVE
HBV SURFACE AG SERPL QL IA: NEGATIVE
HCT VFR BLD AUTO: 32.7 %
HCV AB SERPL QL IA: NEGATIVE
HGB BLD-MCNC: 10.8 G/DL
LYMPHOCYTES # BLD AUTO: 0.9 K/UL
LYMPHOCYTES NFR BLD: 19.9 %
MAGNESIUM SERPL-MCNC: 2.3 MG/DL
MCH RBC QN AUTO: 28.3 PG
MCHC RBC AUTO-ENTMCNC: 33.1 G/DL
MCV RBC AUTO: 85 FL
MONOCYTES # BLD AUTO: 0.6 K/UL
MONOCYTES NFR BLD: 13.6 %
NEUTROPHILS # BLD AUTO: 2.9 K/UL
NEUTROPHILS NFR BLD: 65.7 %
PHOSPHATE SERPL-MCNC: 6.4 MG/DL
PLATELET # BLD AUTO: 198 K/UL
PMV BLD AUTO: 7 FL
POTASSIUM SERPL-SCNC: 4.9 MMOL/L
RBC # BLD AUTO: 3.83 M/UL
SODIUM SERPL-SCNC: 139 MMOL/L
WBC # BLD AUTO: 4.5 K/UL

## 2018-05-16 PROCEDURE — 63600175 PHARM REV CODE 636 W HCPCS: Performed by: INTERNAL MEDICINE

## 2018-05-16 PROCEDURE — 36415 COLL VENOUS BLD VENIPUNCTURE: CPT

## 2018-05-16 PROCEDURE — G8997 SWALLOW GOAL STATUS: HCPCS | Mod: CI

## 2018-05-16 PROCEDURE — 84100 ASSAY OF PHOSPHORUS: CPT

## 2018-05-16 PROCEDURE — 80048 BASIC METABOLIC PNL TOTAL CA: CPT

## 2018-05-16 PROCEDURE — 99232 SBSQ HOSP IP/OBS MODERATE 35: CPT | Mod: ,,, | Performed by: INTERNAL MEDICINE

## 2018-05-16 PROCEDURE — 85025 COMPLETE CBC W/AUTO DIFF WBC: CPT

## 2018-05-16 PROCEDURE — 25000003 PHARM REV CODE 250: Performed by: INTERNAL MEDICINE

## 2018-05-16 PROCEDURE — G8996 SWALLOW CURRENT STATUS: HCPCS | Mod: CK

## 2018-05-16 PROCEDURE — 92610 EVALUATE SWALLOWING FUNCTION: CPT

## 2018-05-16 PROCEDURE — 94761 N-INVAS EAR/PLS OXIMETRY MLT: CPT

## 2018-05-16 PROCEDURE — 83735 ASSAY OF MAGNESIUM: CPT

## 2018-05-16 PROCEDURE — 80100016 HC MAINTENANCE HEMODIALYSIS

## 2018-05-16 PROCEDURE — 12000002 HC ACUTE/MED SURGE SEMI-PRIVATE ROOM

## 2018-05-16 RX ORDER — CINACALCET 30 MG/1
30 TABLET, FILM COATED ORAL
Status: DISCONTINUED | OUTPATIENT
Start: 2018-05-16 | End: 2018-05-16

## 2018-05-16 RX ORDER — ALLOPURINOL 100 MG/1
100 TABLET ORAL DAILY
Status: DISCONTINUED | OUTPATIENT
Start: 2018-05-16 | End: 2018-05-21 | Stop reason: HOSPADM

## 2018-05-16 RX ORDER — TAMSULOSIN HYDROCHLORIDE 0.4 MG/1
0.4 CAPSULE ORAL DAILY
Status: DISCONTINUED | OUTPATIENT
Start: 2018-05-16 | End: 2018-05-19

## 2018-05-16 RX ORDER — ZIPRASIDONE MESYLATE 20 MG/ML
5 INJECTION, POWDER, LYOPHILIZED, FOR SOLUTION INTRAMUSCULAR ONCE
Status: COMPLETED | OUTPATIENT
Start: 2018-05-16 | End: 2018-05-16

## 2018-05-16 RX ORDER — ZIPRASIDONE MESYLATE 20 MG/ML
INJECTION, POWDER, LYOPHILIZED, FOR SOLUTION INTRAMUSCULAR
Status: DISPENSED
Start: 2018-05-16 | End: 2018-05-16

## 2018-05-16 RX ORDER — FUROSEMIDE 20 MG/1
20 TABLET ORAL 2 TIMES DAILY
Status: DISCONTINUED | OUTPATIENT
Start: 2018-05-16 | End: 2018-05-21 | Stop reason: HOSPADM

## 2018-05-16 RX ORDER — BISACODYL 5 MG
5 TABLET, DELAYED RELEASE (ENTERIC COATED) ORAL DAILY PRN
Status: DISCONTINUED | OUTPATIENT
Start: 2018-05-16 | End: 2018-05-21 | Stop reason: HOSPADM

## 2018-05-16 RX ORDER — ASPIRIN 325 MG
325 TABLET ORAL DAILY
Status: DISCONTINUED | OUTPATIENT
Start: 2018-05-16 | End: 2018-05-21 | Stop reason: HOSPADM

## 2018-05-16 RX ADMIN — ERYTHROPOIETIN 5000 UNITS: 20000 INJECTION, SOLUTION INTRAVENOUS; SUBCUTANEOUS at 11:05

## 2018-05-16 RX ADMIN — PIPERACILLIN SODIUM AND TAZOBACTAM SODIUM 2.25 G: 2; .25 INJECTION, POWDER, FOR SOLUTION INTRAVENOUS at 01:05

## 2018-05-16 RX ADMIN — FUROSEMIDE 20 MG: 20 TABLET ORAL at 08:05

## 2018-05-16 RX ADMIN — HEPARIN SODIUM 5000 UNITS: 5000 INJECTION, SOLUTION INTRAVENOUS; SUBCUTANEOUS at 08:05

## 2018-05-16 RX ADMIN — HEPARIN SODIUM 5000 UNITS: 5000 INJECTION, SOLUTION INTRAVENOUS; SUBCUTANEOUS at 09:05

## 2018-05-16 RX ADMIN — PIPERACILLIN SODIUM AND TAZOBACTAM SODIUM 2.25 G: 2; .25 INJECTION, POWDER, FOR SOLUTION INTRAVENOUS at 09:05

## 2018-05-16 RX ADMIN — PIPERACILLIN SODIUM AND TAZOBACTAM SODIUM 2.25 G: 2; .25 INJECTION, POWDER, FOR SOLUTION INTRAVENOUS at 05:05

## 2018-05-16 RX ADMIN — ZIPRASIDONE MESYLATE 5 MG: 20 INJECTION, POWDER, LYOPHILIZED, FOR SOLUTION INTRAMUSCULAR at 07:05

## 2018-05-16 NOTE — NURSING
It was report from dialysis that she removed 4 liters today, vital sings 108/62,75,20,96.5  Pt confused, no changes in mental status, he seems mildly more calm but gets agitated easily, pt was ordered a purred diet but was not able to willing to eat only a couple/few bites.

## 2018-05-16 NOTE — UM SECONDARY REVIEW
Physician Advisor External    Level of Care Issue   OP per EHR Dr Alvarado for CS review of  5/15/18

## 2018-05-16 NOTE — NURSING
This morning pt agitated and could not be comforted or reoriented or consoled I placed a call to MD to inform him of the pt status and a order was taken for medication, It was given and a sitter was called in for the pt. I will continue to monitor for safety and comfort, pt bed alarm is on as well as avasys and a sitter.

## 2018-05-16 NOTE — PT/OT/SLP EVAL
Speech Language Pathology Evaluation  Bedside Swallow    Patient Name:  Roosevelt Gordon   MRN:  [id number]  Admitting Diagnosis: Confusion    Recommendations:                 General Recommendations:  ongoing BSS  Diet recommendations:  Puree, Thin   Aspiration Precautions: Standard aspiration precautions   General Precautions: Standard, fall, pureed diet  Communication strategies:  provide increased time to answer and go to room if call light pushed    History:     Past Medical History:   Diagnosis Date    Renal disorder        Past Surgical History:   Procedure Laterality Date    CARDIAC PACEMAKER PLACEMENT         Social History: Patient lives alone in the community      Prior Intubation HX:  None this admission    Modified Barium Swallow: none reported    Chest X-Rays: New bibasilar infiltrates right more so than left    Prior diet: regular textures & liquids per brother.      Subjective     I gotta tell you something private  Patient goals: unstated      Objective:     Oral Musculature Evaluation  · Oral Musculature: WFL  · Dentition: present and adequate  · Secretion Management: adequate  · Mucosal Quality: dry  · Mandibular Strength and Mobility: WFL  · Oral Labial Strength and Mobility: WFL  · Lingual Strength and Mobility: WFL (did not imitate lateralization)  · Velar Elevation: WFL  · Buccal Strength and Mobility: WFL  · Volitional Cough: unable to elicit  · Volitional Swallow: able to palpate laryngeal movment  · Voice Prior to PO Intake: clear    Bedside Swallow Eval:   Consistencies Assessed:  · Thin liquids via ice chip, spoon, cup & straw sip & 3 oz water challenge via straw  · Nectar thick liquids via spoon & cup sip  · Puree via spoon  · Mixed consistencies peaches in thin juice     Oral Phase: p.m session  WNL on puree, nectar & thin liquids  On peaches:  · Prolonged mastication  · Oral residue    Pharyngeal Phase: p.m. session  · no overt clinical signs/symptoms of aspiration  · no overt clinical  signs/symptoms of pharyngeal dysphagia    Compensatory Strategies  · None    Treatment: education to pt & brother re impressions & recommendations    Assessment:     Roosevelt Gordon is a 84 y.o. male with an SLP diagnosis of Dysphagia.  He presented with significant improvement over yesterday's eval, and this afternoon, significant improvement over this morning.  Recommend pureed textures & thin liquids, meds crushed in puree.  Will follow for tolerance of upgrade..    Goals:    SLP Goals        Problem: SLP Goal    Goal Priority Disciplines Outcome   SLP Goal     SLP Ongoing (interventions implemented as appropriate)   Description:    1) Pt will participate in ongoing swallow evaluation  2) Consume mechanical soft textures and thin liquids with functional oral phase and no overt s/s penetration/aspiration                    Plan:     · Patient to be seen:  5 x/week   · Plan of Care expires:  05/22/18  · Plan of Care reviewed with:  patient, sibling   · SLP Follow-Up:  Yes       Discharge recommendations:      Barriers to Discharge:  None    Time Tracking:     SLP Treatment Date:   05/16/18  Speech Start Time:  0931  Speech Stop Time:  1638     Speech Total Time (min):  427 min 2 sessions, 20 minutes eval time total    Billable [id number] Swallow and Oral Function 20    Mylene Marroquin CCC-SLP/A  05/16/2018

## 2018-05-16 NOTE — PT/OT/SLP PROGRESS
Physical Therapy      Patient Name:  Roosevelt Gordon   MRN:  [id number]    PT order received and chart reviewed. Attempted to see patient for initial evaluation, however patient currently unable to participate in PT due to confusion and combativeness per discussion with RN. PT will continue to follow and attempt at a later time as schedule permits and patient is appropriate.     Jessica LeJeune, PT, DPT

## 2018-05-16 NOTE — PROGRESS NOTES
"Progress Note  Hospital Medicine  Patient Name:Roosevelt Gordon  MRN:  [id number]  Patient Class: OP- Observation  Admit Date: 5/14/2018  Length of Stay: 1 days  Expected Discharge Date:   Attending Physician: Pipo Brown MD  Primary Care Provider:  Carlos Saucedo MD    SUBJECTIVE:     Principal Problem: Confusion  Initial history of present illness: Patient is a 84 y.o. male admitted to Hospitalist Service from [facility] Room with complaint of "confusion". Patient reportedly has past medical history significant for ESRD (on HD M/W/F), dementia and hypertension. Part of the history obtained from patient's son. He confirmed frequent son downing episodes as well. Patient lives alone. Patient presented with complaint of altered mental status. Patient received a call from his dialysis nurse after missing a treatment today. Nurse reportedly called EMS because patient informed her that he was too weak to get out of bed. Patient's last treatment was on Friday. Patient denied chest pain, shortness of breath, abdominal pain, nausea, vomiting, headache, vision changes, focal neuro-deficits, cough or fever. Patient is pleasantly confused. No acute distress.    PMH/PSH/SH/FH/Meds: reviewed.    Symptoms/Review of Systems:  Patient is again confused, having delerium and agitation. Oriented to self only. Coughing. No shortness of breath, chest pain or headache, fever or abdominal pain.     Diet: NPO  Activity level: Up with assistance  Pain:  Patient reports no pain.       OBJECTIVE:   Vital Signs (Most Recent):      Temp: 98.2 °F (36.8 °C) (05/16/18 0909)  Pulse: 63 (05/16/18 0909)  Resp: 18 (05/16/18 0909)  BP: (!) 173/75 (05/16/18 0909)  SpO2: 97 % (05/16/18 0909)       Vital Signs Range (Last 24H):  Temp:  [97 °F (36.1 °C)-98.7 °F (37.1 °C)]   Pulse:  [57-81]   Resp:  [16-21]   BP: ()/(45-95)   SpO2:  [95 %-97 %]     Weight: 70.2 kg (154 lb 12.2 oz)  Body mass index is 22.21 kg/m².    Intake/Output " Summary (Last 24 hours) at 05/16/18 0939  Last data filed at 05/16/18 0130   Gross per 24 hour   Intake              590 ml   Output             3500 ml   Net            -2910 ml     Physical Examination:  General appearance: well developed, appears stated age  Head: normocephalic, atraumatic  Eyes:  conjunctivae/corneas clear. PERRL.  Nose: Nares normal. Septum midline.  Throat: lips, mucosa, and tongue normal; teeth and gums normal, no throat erythema.  Neck: supple, symmetrical, trachea midline, no JVD and thyroid not enlarged, symmetric, no tenderness/mass/nodules  Lungs:  clear to auscultation bilaterally and normal respiratory effort  Chest wall: no tenderness. Right sided PM.  Heart: regular rate and rhythm, S1, S2 normal, no murmur, click, rub or gallop  Abdomen: soft, non-tender non-distented; bowel sounds normal; no masses,  no organomegaly  Extremities: no cyanosis, clubbing or edema. LUE AV fistula with good thrill.    Pulses: 2+ and symmetric  Skin: Skin color, texture, turgor normal. No rashes or lesions.  Lymph nodes: Cervical, supraclavicular, and axillary nodes normal.  Neurologic: Grossly non-focal. Pleasantly confused. Oriented to self only and brother.    CBC:    Recent Labs  Lab 05/14/18  1618 05/15/18  0457 05/16/18  0508   WBC 5.40 4.20 4.50   RBC 3.62* 3.66* 3.83*   HGB 10.2* 10.4* 10.8*   HCT 31.0* 31.1* 32.7*    183 198   MCV 86 85 85   MCH 28.2 28.3 28.3   MCHC 32.9 33.4 33.1   BMP    Recent Labs  Lab 05/14/18  1618 05/15/18  0457 05/16/18  0508    107 101    142 139   K 5.1 5.5* 4.9    101 101   CO2 24 21* 22*   BUN 56* 67* 39*   CREATININE 11.6* 12.7* 8.5*   CALCIUM 9.9 9.9 10.3   MG  --  2.6 2.3      Diagnostic Results:  Microbiology Results (last 7 days)     Procedure Component Value Units Date/Time    Blood Culture #1 [[id number]] Collected:  05/14/18 1845    Order Status:  Completed Specimen:  Blood from Antecubital, Left Updated:  05/16/18 0612     Blood  Culture, Routine No Growth to date     Blood Culture, Routine No Growth to date    Blood Culture #2 [[id number]] Collected:  05/14/18 1901    Order Status:  Completed Specimen:  Blood from Antecubital, Right Updated:  05/16/18 0612     Blood Culture, Routine No Growth to date     Blood Culture, Routine No Growth to date         Chest X-Ray: No acute process.  Cardiomegaly and right cardiac pacer.     CT head: No acute intracranial process.  No significant change.  Mild chronic white matter change.    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 May require Psych evaluation.  Use PRN anti-psychotic.  Follow microbiology results.  Observe fall precautions and neuro-checks.  Physical therapy.    Aspiration Pneumonia  Supplemental O2 via nasal canula; titrate O2 saturation to >92%.   Continue beta 2 agonist bronchodilator treatments.   Continue IV antibiotics.  Check sputum GS and Cx.   Continue routine medications as before.       Yes    Secondary hyperparathyroidism [N25.81]  Continue Phosphate binders and Sensipar. Continue renal diet.    Swallow dysfunction  Consult ST to evaluate swallow. Observe aspiration precaution.  Needs frequent suctioning.  Continue IV Zosyn for possible aspiration pneumonitis.      Yes    ESRD (end stage renal disease) [N18.6]  Monitor BUN/SCr. HD as per nephrology.  Monitor I/Os.  Monitor electrolytes.    Hyperkalemia  Getting HD. Follow telemetry.      Yes    History of pacemaker [Z95.0]  Tele-monitoring.      Not Applicable   Discussed with patient's brother and answered all the questions.      DVT prophylaxis: Heparin 5 K Sq q 12 hrs.    Discussed with case management, awaiting patient's brother to discuss disposition. Patient does not appears to be safe to live alone.    VTE Risk Mitigation         Ordered     heparin (porcine) injection 5,000  Units  Every 12 hours      05/14/18 2047     IP VTE HIGH RISK PATIENT  Once      05/14/18 2047     Place MITA hose  Until discontinued      05/14/18 2047        Pipo Brown MD  Department of Hospital Medicine   [facility]

## 2018-05-16 NOTE — PLAN OF CARE
Problem: Patient Care Overview  Goal: Plan of Care Review  Outcome: Ongoing (interventions implemented as appropriate)  Pt remained free from fall or injury during shift.  Pt slept on and off with VSS.  PIV in place, patent and IV abx given as ordered.  Pt received HD with 3 L off.  No BM during shift.  No skin breakdown with assessment.  Pt was repositioned intermittently to prevent breakdown.  Aspiration precautions maintained with suction at bedside.  Bed alarm in use with door open and pt near nurses station to facilitate safety.  Bedside rails raised x2 with call bell and bedside table within reach.  Nurse rounded hourly to ensure pt safety.

## 2018-05-16 NOTE — PROGRESS NOTES
INPATIENT NEPHROLOGY PROGRESS NOTE  [facility] NEPHROLOGY    Patient Name: Roosevelt Gordon  Date: 05/16/2018    Reason for consultation: ESRD    Chief Complaint:   Chief Complaint   Patient presents with    Altered Mental Status     Missed dialysis today / possibly confused        History of Present Illness:  85 y/o M with ESRD on HD MWF, HTN and dementia who p/w AMS. He missed HD on Monday. Nurse at HD unit asked someone to check on him and he was noted to be confused and was sent to the ER. We have been consulted for dialysis.     · Interval History/Subjective:    - no issues with HD yesterday  - BP is better but remains elevated, on RA  - seen on HD- no issues- remains confused    · Review of Systems: unable to obtain due to AMS    Plan of Care:    Assessment:  AMS  ESRD  HTN  Hyperkalemia  SHPT  Anemia of CKD     Plan:    - He remains confused but is at least more talkative today even though it is hard to follow what he is saying. He is not at his baseline.    - Transition to regular MWF HD schedule.  - BP/VS are improved. Ordered another 3-4L UF today.  - HyperK is resolved. Continue a renal diet.   - Phos is better. Continue sevelamer and sensipar.  - Anemia is stable. Continue EPO with HD.     Thank you for allowing us to participate in this patient's care. We will continue to follow.    Medications:  No current facility-administered medications on file prior to encounter.      Current Outpatient Prescriptions on File Prior to Encounter   Medication Sig Dispense Refill    acetaminophen (TYLENOL) 325 MG tablet Take 2 tablets (650 mg total) by mouth every 8 (eight) hours as needed for Pain or Temperature greater than (100.3).  0    allopurinol (ZYLOPRIM) 100 MG tablet Take 100 mg by mouth once daily.      aspirin 325 MG tablet Take 325 mg by mouth once daily.      bisacodyl (DULCOLAX) 5 mg EC tablet Take 5 mg by mouth daily as needed for Constipation.      cinacalcet (SENSIPAR) 30 MG Tab Take 30 mg by mouth  daily with breakfast.      diphenhydrAMINE (BENADRYL) 25 mg capsule Take 1 each (25 mg total) by mouth every 6 (six) hours as needed for Itching.  0    famotidine (PEPCID) 20 MG tablet Take 20 mg by mouth 2 (two) times daily.      furosemide (LASIX) 20 MG tablet Take 20 mg by mouth 2 (two) times daily.      meclizine (ANTIVERT) 32 MG tablet Take 25 mg by mouth 3 (three) times daily as needed. 1/2 tablet three times daily prn      nystatin (MYCOSTATIN) ointment Apply topically 2 (two) times daily. 15 g 1    sevelamer carbonate (RENVELA) 800 mg Tab Take 800 mg by mouth 3 (three) times daily with meals. 3 tablets with each meal      tamsulosin (FLOMAX) 0.4 mg Cp24 Take 0.4 mg by mouth once daily.       Scheduled Meds:   allopurinol  100 mg Oral Daily    amLODIPine  5 mg Oral Daily    aspirin  325 mg Oral Daily    cinacalcet  30 mg Oral Daily with breakfast    epoetin pinky (PROCRIT) injection  5,000 Units Intravenous Every Mon, Wed, Fri    furosemide  20 mg Oral BID    heparin (porcine)  5,000 Units Subcutaneous Q12H    piperacillin-tazobactam (ZOSYN) IVPB  2.25 g Intravenous Q8H    sevelamer carbonate  800 mg Oral TID WM    tamsulosin  0.4 mg Oral Daily    ziprasidone         Continuous Infusions:  PRN Meds:.acetaminophen, bisacodyl, hydrALAZINE, ondansetron, senna-docusate 8.6-50 mg    Allergies:  Patient has no known allergies.    Vital Signs:  Temp Readings from Last 3 Encounters:   05/16/18 98.2 °F (36.8 °C) (Oral)   05/11/18 98.1 °F (36.7 °C) (Oral)   03/29/17 98.3 °F (36.8 °C) (Oral)       Pulse Readings from Last 3 Encounters:   05/16/18 63   05/11/18 79   07/21/17 81       BP Readings from Last 3 Encounters:   05/16/18 (!) 173/75   05/11/18 127/83   07/21/17 (!) 140/74       Weight:  Wt Readings from Last 3 Encounters:   05/14/18 70.2 kg (154 lb 12.2 oz)   05/11/18 70.3 kg (155 lb)   03/29/17 70.3 kg (154 lb 15.7 oz)       Physical Exam:  Constitutional: nad, aa, o x 0  Heart: rrr, jessy, no  r/g, wwp, no edema  Lungs: ant ausc clear, no w/r/r/c, no lb  Abdomen: s/nt/nd, +BS    Results:  Lab Results   Component Value Date     05/16/2018    K 4.9 05/16/2018     05/16/2018    CO2 22 (L) 05/16/2018    BUN 39 (H) 05/16/2018    CREATININE 8.5 (H) 05/16/2018    CALCIUM 10.3 05/16/2018    ANIONGAP 16 05/16/2018    ESTGFRAFRICA 6 (A) 05/16/2018    EGFRNONAA 5 (A) 05/16/2018       Lab Results   Component Value Date    CALCIUM 10.3 05/16/2018    PHOS 6.4 (H) 05/16/2018         Recent Labs  Lab 05/16/18  0508   WBC 4.50   RBC 3.83*   HGB 10.8*   HCT 32.7*      MCV 85   MCH 28.3   MCHC 33.1       I have personally reviewed pertinent radiological imaging and reports.    Chad Desai MD MPH  [facility] [address]  [place] [address]  [phone] (p)  [phone] (f)

## 2018-05-16 NOTE — PROGRESS NOTES
Pt tolerated hd. Total UF 4 liters. Pt received epogen 5,000 units with hd. Gave report to DORIS Thomas.

## 2018-05-16 NOTE — PLAN OF CARE
Problem: SLP Goal  Goal: SLP Goal    1) Pt will participate in ongoing swallow evaluation  2) Consume mechanical soft textures and thin liquids with functional oral phase and no overt s/s penetration/aspiration   Outcome: Ongoing (interventions implemented as appropriate)  Pt seen for 2 sessions  A.m. 1. Tolerated ice chips & nectar thick liquid via 1/2 tsp with no s/s pharyngeal dysphagia, but prolonged oral stage & inattention/talking constantly.    P.m. 1. Consumed 1 tsp applesauce, 2 oz nectar thick liquid via spoon & cup, ice chips, cup & straw sips of thin water & 3 oz water challenge via straw with no s/s pharyngeal dysphagia.  Prolonged mastication of peaches with scattered oral residue, poor attention/talking.

## 2018-05-16 NOTE — PLAN OF CARE
Spoke with the pt's brother, Judd at the bedside; he confirmed that the pt was living alone and driving before coming into the hospital. He says they have been told before that the pt has early dementia and that the pt is much worse when not in his usual environment. The pt stayed with Judd in [facility] about a year ago for about 4 wks. When he was at the brothers he went to Outpt dialysis in [facility] on [address].   I told Judd that Dr Brown will not clear the pt to discharge home alone when he is medically ready and that he is not safe to live alone anymore.   At the time of discharge the pt will go to Judd's home.  I will contact the pt's dialysis unit tomorrow about transferring his treatments...Dian Lweis       05/16/18 6235   Discharge Reassessment   Assessment Type Discharge Planning Reassessment

## 2018-05-16 NOTE — PLAN OF CARE
Problem: Patient Care Overview  Goal: Plan of Care Review  Outcome: Ongoing (interventions implemented as appropriate)  Pt has remained free from injury this shift, he has completed dialysis today with no problems, he is more calm at this time then he has been all day, but he does become easily agitated with stimulation.  Bed alarm on, avasys, sitter at bedside for added safety. He has speech eval today and a diet has been ordered for him.  He has not taken any po medications related to speech eval earlier today, he has not temp, confused times 4 , following minimal commands. Call light in reach.

## 2018-05-17 LAB
ANION GAP SERPL CALC-SCNC: 14 MMOL/L
BASOPHILS # BLD AUTO: 0 K/UL
BASOPHILS NFR BLD: 0.1 %
BUN SERPL-MCNC: 35 MG/DL
CALCIUM SERPL-MCNC: 10.2 MG/DL
CHLORIDE SERPL-SCNC: 98 MMOL/L
CO2 SERPL-SCNC: 22 MMOL/L
CREAT SERPL-MCNC: 6.9 MG/DL
DIFFERENTIAL METHOD: ABNORMAL
EOSINOPHIL # BLD AUTO: 0.1 K/UL
EOSINOPHIL NFR BLD: 2.1 %
ERYTHROCYTE [DISTWIDTH] IN BLOOD BY AUTOMATED COUNT: 15.8 %
EST. GFR  (AFRICAN AMERICAN): 8 ML/MIN/1.73 M^2
EST. GFR  (NON AFRICAN AMERICAN): 7 ML/MIN/1.73 M^2
GLUCOSE SERPL-MCNC: 96 MG/DL
HCT VFR BLD AUTO: 34.5 %
HGB BLD-MCNC: 11.4 G/DL
LYMPHOCYTES # BLD AUTO: 1 K/UL
LYMPHOCYTES NFR BLD: 23.7 %
MAGNESIUM SERPL-MCNC: 2.2 MG/DL
MCH RBC QN AUTO: 28.2 PG
MCHC RBC AUTO-ENTMCNC: 33.1 G/DL
MCV RBC AUTO: 85 FL
MONOCYTES # BLD AUTO: 0.6 K/UL
MONOCYTES NFR BLD: 13 %
NEUTROPHILS # BLD AUTO: 2.7 K/UL
NEUTROPHILS NFR BLD: 61.1 %
PHOSPHATE SERPL-MCNC: 5.4 MG/DL
PLATELET # BLD AUTO: 200 K/UL
PMV BLD AUTO: 7.7 FL
POTASSIUM SERPL-SCNC: 4.3 MMOL/L
RBC # BLD AUTO: 4.05 M/UL
SODIUM SERPL-SCNC: 134 MMOL/L
WBC # BLD AUTO: 4.3 K/UL

## 2018-05-17 PROCEDURE — 25000003 PHARM REV CODE 250: Performed by: INTERNAL MEDICINE

## 2018-05-17 PROCEDURE — 80048 BASIC METABOLIC PNL TOTAL CA: CPT

## 2018-05-17 PROCEDURE — 99232 SBSQ HOSP IP/OBS MODERATE 35: CPT | Mod: ,,, | Performed by: INTERNAL MEDICINE

## 2018-05-17 PROCEDURE — 94761 N-INVAS EAR/PLS OXIMETRY MLT: CPT

## 2018-05-17 PROCEDURE — 63600175 PHARM REV CODE 636 W HCPCS: Performed by: INTERNAL MEDICINE

## 2018-05-17 PROCEDURE — 84100 ASSAY OF PHOSPHORUS: CPT

## 2018-05-17 PROCEDURE — 85025 COMPLETE CBC W/AUTO DIFF WBC: CPT

## 2018-05-17 PROCEDURE — 83735 ASSAY OF MAGNESIUM: CPT

## 2018-05-17 PROCEDURE — 25000003 PHARM REV CODE 250: Performed by: EMERGENCY MEDICINE

## 2018-05-17 PROCEDURE — 92526 ORAL FUNCTION THERAPY: CPT

## 2018-05-17 PROCEDURE — 12000002 HC ACUTE/MED SURGE SEMI-PRIVATE ROOM

## 2018-05-17 PROCEDURE — 36415 COLL VENOUS BLD VENIPUNCTURE: CPT

## 2018-05-17 PROCEDURE — 25000003 PHARM REV CODE 250: Performed by: HOSPITALIST

## 2018-05-17 RX ORDER — ZIPRASIDONE MESYLATE 20 MG/ML
5 INJECTION, POWDER, LYOPHILIZED, FOR SOLUTION INTRAMUSCULAR ONCE
Status: DISCONTINUED | OUTPATIENT
Start: 2018-05-18 | End: 2018-05-18

## 2018-05-17 RX ORDER — OLANZAPINE 10 MG/2ML
5 INJECTION, POWDER, FOR SOLUTION INTRAMUSCULAR ONCE
Status: DISCONTINUED | OUTPATIENT
Start: 2018-05-18 | End: 2018-05-17

## 2018-05-17 RX ADMIN — HEPARIN SODIUM 5000 UNITS: 5000 INJECTION, SOLUTION INTRAVENOUS; SUBCUTANEOUS at 09:05

## 2018-05-17 RX ADMIN — FUROSEMIDE 20 MG: 20 TABLET ORAL at 09:05

## 2018-05-17 RX ADMIN — PIPERACILLIN SODIUM AND TAZOBACTAM SODIUM 2.25 G: 2; .25 INJECTION, POWDER, FOR SOLUTION INTRAVENOUS at 01:05

## 2018-05-17 RX ADMIN — PIPERACILLIN SODIUM AND TAZOBACTAM SODIUM 2.25 G: 2; .25 INJECTION, POWDER, FOR SOLUTION INTRAVENOUS at 04:05

## 2018-05-17 RX ADMIN — PIPERACILLIN SODIUM AND TAZOBACTAM SODIUM 2.25 G: 2; .25 INJECTION, POWDER, FOR SOLUTION INTRAVENOUS at 09:05

## 2018-05-17 RX ADMIN — ASPIRIN 325 MG ORAL TABLET 325 MG: 325 PILL ORAL at 09:05

## 2018-05-17 RX ADMIN — ALLOPURINOL 100 MG: 100 TABLET ORAL at 09:05

## 2018-05-17 RX ADMIN — SEVELAMER CARBONATE 800 MG: 800 TABLET, FILM COATED ORAL at 06:05

## 2018-05-17 RX ADMIN — SEVELAMER CARBONATE 800 MG: 800 TABLET, FILM COATED ORAL at 11:05

## 2018-05-17 RX ADMIN — CINACALCET HYDROCHLORIDE 30 MG: 30 TABLET, COATED ORAL at 07:05

## 2018-05-17 RX ADMIN — SEVELAMER CARBONATE 800 MG: 800 TABLET, FILM COATED ORAL at 07:05

## 2018-05-17 RX ADMIN — AMLODIPINE BESYLATE 5 MG: 5 TABLET ORAL at 09:05

## 2018-05-17 RX ADMIN — TAMSULOSIN HYDROCHLORIDE 0.4 MG: 0.4 CAPSULE ORAL at 09:05

## 2018-05-17 NOTE — PT/OT/SLP PROGRESS
"Speech Language Pathology Treatment    Patient Name:  Roosevelt Gordon   MRN:  [id number]  Admitting Diagnosis: Confusion    Recommendations:                 General Recommendations:  Dysphagia therapy  Diet recommendations:  Puree, Liquid Diet Level: Thin   Aspiration Precautions: 1 bite/sip at a time, Assistance with thickening liquids, Avoid talking while eating, Eliminate distractions, HOB to 90 degrees, Meds crushed in puree and Standard aspiration precautions   General Precautions: Standard, aspiration, fall, pureed diet  Communication strategies:  provide increased time to answer and reorient as needed    Subjective     "Stress putting me in here." Pt shares he is under financial stress (?)      Pain/Comfort:  ·      Objective:   Pt is awake, alert, and up in chair. Sitter and AvaSys at bedside. He requires extra time to respond to orientation questions, however, he is oriented to self, year and hospital. He is able to state the name of current President. Sitter at bedside reports no difficulty with breakfast this AM. Pt was seen for tolerance of current diet and upgraded textures.     Has the patient been evaluated by SLP for swallowing?   Yes  Keep patient NPO? No   Current Respiratory Status: nasal cannula      Assessment:     Roosevelt Gordon is a 84 y.o. male with an SLP diagnosis of Dysphagia and Cognitive-Linguistic Impairment.  He presents with much improved mental status, however, he remains confused. He is conversing today, however, answers inappropriately at times. Able to follow simple commands. Today, pt tolerated thin liquids via straw and applesauce with fx oral phase and no overt s/s penetration/aspiration. +throat clearing, prior to initiation of swallow, noted on 2 of 2 peach trials. Prolonged mastication also noted with mild oral residue which patient was able to clear with liquid wash. He required verbal cues to avoid talking with PO. REC continue current diet with goal for upgraded textures as " patient's mental status appears to be improving.    Goals:    SLP Goals        Problem: SLP Goal    Goal Priority Disciplines Outcome   SLP Goal     SLP Ongoing (interventions implemented as appropriate)   Description:    1) Pt will participate in ongoing swallow evaluation  2) Consume mechanical soft textures and thin liquids with functional oral phase and no overt s/s penetration/aspiration                    Plan:     · Patient to be seen:  5 x/week   · Plan of Care expires:  05/22/18  · Plan of Care reviewed with:  patient, sibling   · SLP Follow-Up:  Yes       Discharge recommendations:  nursing facility, skilled       Time Tracking:     SLP Treatment Date:   05/17/18  Speech Start Time:  1000  Speech Stop Time:  1013     Speech Total Time (min):  13 min    Billable Minutes: Treatment Swallowing Dysfunction 13 and Total Time 13    Yudith Mario CCC-SLP  05/17/2018

## 2018-05-17 NOTE — PROGRESS NOTES
I met with the pts brother, Judd at bedside to discuss discharge plans. I updated him that Evelyne GEORGE CM is working with his current dialysis unit to get the dialysis transferred to the [facility]. He asked if the pt would be here throughout the weekend. I told him that I would update Evelyne GEORGE CM and get with Dr. Brown and that we would have a more definite answer regarding discharge tomorrow. I asked if he would be coming to the hospital tomorrow and he replied, yes sometime in the afternoon. I updated DORIS Stubbs CM. Liliana Burns LMSW

## 2018-05-17 NOTE — PLAN OF CARE
05/17/18 0808   Patient Assessment/Suction   Level of Consciousness (AVPU) alert   Respiratory Effort Normal;Unlabored   PRE-TX-O2-ETCO2   O2 Device (Oxygen Therapy) room air   SpO2 95 %   Pulse Oximetry Type Intermittent   $ Pulse Oximetry - Multiple Charge Pulse Oximetry - Multiple

## 2018-05-17 NOTE — PLAN OF CARE
Problem: SLP Goal  Goal: SLP Goal    1) Pt will participate in ongoing swallow evaluation  2) Consume mechanical soft textures and thin liquids with functional oral phase and no overt s/s penetration/aspiration     Mental status improving. Continue current diet with goal for upgraded textures. Cont POC.     Yudith Mario MS, CCC/SLP 5/17/2018

## 2018-05-17 NOTE — PLAN OF CARE
Problem: Physical Therapy Goal  Goal: Physical Therapy Goal  Goals to be met by: 2018     Patient will increase functional independence with mobility by performin. Supine to sit with Stand-by Assistance  2. Sit to supine with Stand-by Assistance  3. Sit to stand transfer with Stand-by Assistance  4. Bed to chair transfer with Stand-by Assistance using Rolling Walker  5. Gait  x 150 feet with Stand-by Assistance using Rolling Walker.   6. Lower extremity exercise program x10 reps, with supervision    Outcome: Ongoing (interventions implemented as appropriate)  PT evaluation complete. Recommend SNF vs home with  support.

## 2018-05-17 NOTE — PLAN OF CARE
Problem: Patient Care Overview  Goal: Plan of Care Review  Pt had an uneventful night free from fall or injury. Pt slept well with minimal awakenings.  Pt remained confused but was calm and cooperative following commands.  Pt tolerated PO and took meds crushed in pudding.  PIV in place with IV abx given as scheduled.  Pt was turned and repositioned frequently to prevent skin breakdown, BM facilitated during shift.  VSS, pt denied pain.  POC was discussed with pt and care team with pt not verbalizing an understanding of POC due to confusion.  Esitter in use at bedside.  Bed alarm in use with call bell within reach.  Aspiration precautions maintained with suction at the bedside. Nurse rounded hourly to ensure pt safety.

## 2018-05-17 NOTE — PROGRESS NOTES
"Progress Note  Hospital Medicine  Patient Name:Roosevelt Gordon  MRN:  [id number]  Patient Class: IP- Inpatient  Admit Date: 5/14/2018  Length of Stay: 2 days  Expected Discharge Date:   Attending Physician: Pipo Brown MD  Primary Care Provider:  Carlos Saucedo MD    SUBJECTIVE:     Principal Problem: Confusion  Initial history of present illness: Patient is a 84 y.o. male admitted to Hospitalist Service from [facility] Emergency Room with complaint of "confusion". Patient reportedly has past medical history significant for ESRD (on HD M/W/F), dementia and hypertension. Part of the history obtained from patient's son. He confirmed frequent son downing episodes as well. Patient lives alone. Patient presented with complaint of altered mental status. Patient received a call from his dialysis nurse after missing a treatment today. Nurse reportedly called EMS because patient informed her that he was too weak to get out of bed. Patient's last treatment was on Friday. Patient denied chest pain, shortness of breath, abdominal pain, nausea, vomiting, headache, vision changes, focal neuro-deficits, cough or fever. Patient is pleasantly confused. No acute distress.    PMH/PSH/SH/FH/Meds: reviewed.    Symptoms/Review of Systems:  Patient is with much improved mentation. A+O x2. Reports less Coughing. No shortness of breath, chest pain or headache, fever or abdominal pain.     Diet: pureed textures & thin liquids, meds crushed in puree  Activity level: Up with assistance  Pain:  Patient reports no pain.       OBJECTIVE:   Vital Signs (Most Recent):      Temp: 97.3 °F (36.3 °C) (05/17/18 0726)  Pulse: 66 (05/17/18 0726)  Resp: 20 (05/17/18 0726)  BP: 136/66 (05/17/18 0726)  SpO2: 95 % (05/17/18 0808)       Vital Signs Range (Last 24H):  Temp:  [96.5 °F (35.8 °C)-97.7 °F (36.5 °C)]   Pulse:  [59-83]   Resp:  [20]   BP: ()/()   SpO2:  [95 %-100 %]     Weight: 70.2 kg (154 lb 12.2 oz)  Body mass index is 22.21 " kg/m².    Intake/Output Summary (Last 24 hours) at 05/17/18 0911  Last data filed at 05/17/18 0551   Gross per 24 hour   Intake              890 ml   Output             4500 ml   Net            -3610 ml     Physical Examination:  General appearance: well developed, appears stated age  Head: normocephalic, atraumatic  Eyes:  conjunctivae/corneas clear. PERRL.  Nose: Nares normal. Septum midline.  Throat: lips, mucosa, and tongue normal; teeth and gums normal, no throat erythema.  Neck: supple, symmetrical, trachea midline, no JVD and thyroid not enlarged, symmetric, no tenderness/mass/nodules  Lungs:  clear to auscultation bilaterally and normal respiratory effort  Chest wall: no tenderness. Right sided PM.  Heart: regular rate and rhythm, S1, S2 normal, no murmur, click, rub or gallop  Abdomen: soft, non-tender non-distented; bowel sounds normal; no masses,  no organomegaly  Extremities: no cyanosis, clubbing or edema. LUE AV fistula with good thrill.    Pulses: 2+ and symmetric  Skin: Skin color, texture, turgor normal. No rashes or lesions.  Lymph nodes: Cervical, supraclavicular, and axillary nodes normal.  Neurologic: Grossly non-focal. Pleasantly confused. Oriented to self only and brother.    CBC:    Recent Labs  Lab 05/15/18  0457 05/16/18  0508 05/17/18  0602   WBC 4.20 4.50 4.30   RBC 3.66* 3.83* 4.05*   HGB 10.4* 10.8* 11.4*   HCT 31.1* 32.7* 34.5*    198 200   MCV 85 85 85   MCH 28.3 28.3 28.2   MCHC 33.4 33.1 33.1   BMP    Recent Labs  Lab 05/15/18  0457 05/16/18  0508 05/17/18  0601    101 96    139 134*   K 5.5* 4.9 4.3    101 98   CO2 21* 22* 22*   BUN 67* 39* 35*   CREATININE 12.7* 8.5* 6.9*   CALCIUM 9.9 10.3 10.2   MG 2.6 2.3 2.2      Diagnostic Results:  Microbiology Results (last 7 days)     Procedure Component Value Units Date/Time    Blood Culture #1 [[id number]] Collected:  05/14/18 1845    Order Status:  Completed Specimen:  Blood from Antecubital, Left Updated:   05/17/18 0612     Blood Culture, Routine No Growth to date     Blood Culture, Routine No Growth to date     Blood Culture, Routine No Growth to date    Blood Culture #2 [[id number]] Collected:  05/14/18 1901    Order Status:  Completed Specimen:  Blood from Antecubital, Right Updated:  05/17/18 0612     Blood Culture, Routine No Growth to date     Blood Culture, Routine No Growth to date     Blood Culture, Routine No Growth to date         Chest X-Ray: No acute process.  Cardiomegaly and right cardiac pacer.     CT head: No acute intracranial process.  No significant change.  Mild chronic white matter change.    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 May require Psych evaluation.  Use PRN anti-psychotic.  Follow microbiology results.  Observe fall precautions and neuro-checks.  Physical therapy.    Aspiration Pneumonia  Supplemental O2 via nasal canula; titrate O2 saturation to >92%.   Continue beta 2 agonist bronchodilator treatments.   Continue IV antibiotics.  Check sputum GS and Cx.   Observe aspiration precautions and continue modified diet recommended by ST.  Continue routine medications as before.       Yes    Secondary hyperparathyroidism [N25.81]  Continue Phosphate binders and Sensipar. Continue renal diet.    Swallow dysfunction  Observe aspiration precautions and continue modified diet recommended by ST.  Needs frequent suctioning.  Continue IV Zosyn for aspiration pneumonitis.      Yes    ESRD (end stage renal disease) [N18.6]  Monitor BUN/SCr. HD as per nephrology.  Monitor I/Os.  Monitor electrolytes.    Hyperkalemia  Getting HD. Follow telemetry.      Yes    History of pacemaker [Z95.0]  Tele-monitoring.      Not Applicable   Discussed with patient's brother and answered all the questions.      DVT prophylaxis: Heparin 5 K Sq q 12 hrs.    DC planning:  Patient to live with his brother upon DC.    VTE Risk Mitigation         Ordered     heparin (porcine) injection 5,000 Units  Every 12 hours      05/14/18 2047     IP VTE HIGH RISK PATIENT  Once      05/14/18 2047     Place MITA hose  Until discontinued      05/14/18 2047        Pipo Brown MD  Department of Hospital Medicine   [facility]

## 2018-05-17 NOTE — PT/OT/SLP EVAL
"Physical Therapy Evaluation    Patient Name:  Roosevelt Gordon   MRN:  [id number]    Recommendations:     Discharge Recommendations:  nursing facility, skilled   Discharge Equipment Recommendations: walker, rolling   Barriers to discharge: Decreased caregiver support    Assessment:     Roosevelt Gordon is a 84 y.o. male admitted with a medical diagnosis of Confusion.  He presents with the following impairments/functional limitations:  weakness, impaired endurance, impaired functional mobilty, gait instability, impaired self care skills, impaired balance, impaired cognition, decreased safety awareness. Pt was agreeable to PT session but with decreased safety awareness and weakness which limited safety with all mobility.    Rehab Prognosis:  good; patient would benefit from acute skilled PT services to address these deficits and reach maximum level of function.      Recent Surgery: * No surgery found *      Plan:     During this hospitalization, patient to be seen 6 x/week to address the above listed problems via gait training, therapeutic activities, therapeutic exercises  · Plan of Care Expires:  06/15/18   Plan of Care Reviewed with: patient    Subjective     Communicated with RN prior to session.  Patient found supine in bed upon PT entry to room, agreeable to evaluation.      Chief Complaint: Weakness  Patient comments/goals: "I don't walk too far."  Pain/Comfort:  · Pain Rating 1: 0/10    Patients cultural, spiritual, Caodaism conflicts given the current situation: None    Living Environment:  Pt lives alone and was mod I with mobility PTA per chart review.   Patient has the following equipment: cane, straight. Upon discharge, patient will have assistance from his brother.    Objective:     Patient found with: peripheral IV, telemetry     General Precautions: Standard, fall, aspiration, pureed diet   Orthopedic Precautions:N/A   Braces: N/A     Exams:  · Cognitive Exam:  Patient is oriented to Person and Place "   · Gross Motor Coordination:  WFL  · Postural Exam:  Patient presented with the following abnormalities:    · -       Rounded shoulders  · -       Forward head  · -       Posterior pelvic tilt  · RUE ROM: WFL  · RUE Strength: WFL  · LUE ROM: WFL  · LUE Strength: WFL  · RLE ROM: WFL  · RLE Strength: 3/5 observed through functional mobility  · LLE ROM: WFL  · LLE Strength: 3/5 observed through functional mobility    Functional Mobility:  · Bed Mobility:  · Supine to Sit: minimum assistance  · Transfers  · Sit to Stand:  minimum assistance with rolling walker   · Cues for hand placement and safely approaching chair  · Gait:  · Pt took 3 steps laterally to chair with minimal assistance using a rolling walker  · Pt with forward flexed posture and decreased knee and hip extension    AM-PAC 6 CLICK MOBILITY  Total Score:16     Patient left up in chair with all lines intact, call button in reach and sitter present.    GOALS:    Physical Therapy Goals        Problem: Physical Therapy Goal    Goal Priority Disciplines Outcome Goal Variances Interventions   Physical Therapy Goal     PT/OT, PT Ongoing (interventions implemented as appropriate)     Description:  Goals to be met by: 2018     Patient will increase functional independence with mobility by performin. Supine to sit with Stand-by Assistance  2. Sit to supine with Stand-by Assistance  3. Sit to stand transfer with Stand-by Assistance  4. Bed to chair transfer with Stand-by Assistance using Rolling Walker  5. Gait  x 150 feet with Stand-by Assistance using Rolling Walker.   6. Lower extremity exercise program x10 reps, with supervision                      History:     Past Medical History:   Diagnosis Date    Renal disorder        Past Surgical History:   Procedure Laterality Date    CARDIAC PACEMAKER PLACEMENT         Clinical Decision Making:     History  Co-morbidities and personal factors that may impact the plan of care Examination  Body Structures  and Functions, activity limitations and participation restrictions that may impact the plan of care Clinical Presentation   Decision Making/ Complexity Score   Co-morbidities:   [] Time since onset of injury / illness / exacerbation  [] Status of current condition  []Patient's cognitive status and safety concerns    [] Multiple Medical Problems (see med hx)  Personal Factors:   [] Patient's age  [] Prior Level of function   [] Patient's home situation (environment and family support)  [] Patient's level of motivation  [] Expected progression of patient      HISTORY:(criteria)    [] 22448 - no personal factors/history    [] 23982 - has 1-2 personal factor/comorbidity     [] 66844 - has >3 personal factor/comorbidity     Body Regions:  [] Objective examination findings  [] Head     []  Neck  [] Trunk   [] Upper Extremity  [] Lower Extremity    Body Systems:  [] For communication ability, affect, cognition, language, and learning style: the assessment of the ability to make needs known, consciousness, orientation (person, place, and time), expected emotional /behavioral responses, and learning preferences (eg, learning barriers, education  needs)  [] For the neuromuscular system: a general assessment of gross coordinated movement (eg, balance, gait, locomotion, transfers, and transitions) and motor function  (motor control and motor learning)  [] For the musculoskeletal system: the assessment of gross symmetry, gross range of motion, gross strength, height, and weight  [] For the integumentary system: the assessment of pliability(texture), presence of scar formation, skin color, and skin integrity  [] For cardiovascular/pulmonary system: the assessment of heart rate, respiratory rate, blood pressure, and edema     Activity limitations:    [] Patient's cognitive status and saf ety concerns          [] Status of current condition      [] Weight bearing restriction  [] Cardiopulmunary Restriction    Participation  Restrictions:   [] Goals and goal agreement with the patient     [] Rehab potential (prognosis) and probable outcome      Examination of Body System: (criteria)    [] 34607 - addressing 1-2 elements    [] 85531 - addressing a total of 3 or more elements     [] 91083 -  Addressing a total of 4 or more elements         Clinical Presentation: (criteria)  Choose one     On examination of body system using standardized tests and measures patient presents with (CHOOSE ONE) elements from any of the following: body structures and functions, activity limitations, and/or participation restrictions.  Leading to a clinical presentation that is considered (CHOOSE ONE)                              Clinical Decision Making  (Eval Complexity):  Choose One     Time Tracking:     PT Received On: 05/17/18  PT Start Time: 0936     PT Stop Time: 0947  PT Total Time (min): 11 min     Billable Minutes: Evaluation 11      Jessica LeJeune, PT, DPT

## 2018-05-17 NOTE — PLAN OF CARE
Problem: Patient Care Overview  Goal: Plan of Care Review  Outcome: Ongoing (interventions implemented as appropriate)  Plan of care reviewed with patient. Patient verbalized complete understanding. Antibiotics administered as ordered. HD scheduled for tomorrow. All fall precautions maintained. Bed in lowest position, locked, call light within reach. Side rails up x's 2. Slip resistant socks maintained.

## 2018-05-17 NOTE — PLAN OF CARE
I called the pt's dialysis unit, [facility] in [place] ([phone]) to request transfer to [facility]; the clinic is only open on [facility].   I spoke with Edison at [facility] in [place] (ph#[phone]); she has an available chair time at 12:45pm on [facility] but the transfer request has to come from the pt's current unit.   I will follow up tomorrow with both [place] and [place] to arrange transfer....DORIS Lewis       05/17/18 6393   Discharge Reassessment   Assessment Type Discharge Planning Reassessment

## 2018-05-17 NOTE — PROGRESS NOTES
INPATIENT NEPHROLOGY PROGRESS NOTE  [facility] NEPHROLOGY    Patient Name: Roosevelt Gordon  Date: 05/17/2018    Reason for consultation: ESRD    Chief Complaint:   Chief Complaint   Patient presents with    Altered Mental Status     Missed dialysis today / possibly confused        History of Present Illness:  85 y/o M with ESRD on HD MWF, HTN and dementia who p/w AMS. He missed HD on Monday. Nurse at HD unit asked someone to check on him and he was noted to be confused and was sent to the ER. We have been consulted for dialysis.     · Interval History/Subjective:    - got off 4L with HD yest  - BP is much better    · Review of Systems: Unable to obtain due to baseline dementia    Plan of Care:    Assessment:  AMS  ESRD  HTN  Hyperkalemia  SHPT  Anemia of CKD     Plan:     - His mental status is back to baseline.   - Continue regular MWF HD schedule.  - BP/VS are improved. Change UF to 2-3L with HD.  - HyperK remains resolved. Continue a renal diet.   - Phos is at goal. Continue sevelamer and sensipar.  - Anemia is stable. Continue EPO with HD.     D/C per hospitalist.     Thank you for allowing us to participate in this patient's care. We will continue to follow.    Medications:  No current facility-administered medications on file prior to encounter.      Current Outpatient Prescriptions on File Prior to Encounter   Medication Sig Dispense Refill    acetaminophen (TYLENOL) 325 MG tablet Take 2 tablets (650 mg total) by mouth every 8 (eight) hours as needed for Pain or Temperature greater than (100.3).  0    allopurinol (ZYLOPRIM) 100 MG tablet Take 100 mg by mouth once daily.      aspirin 325 MG tablet Take 325 mg by mouth once daily.      bisacodyl (DULCOLAX) 5 mg EC tablet Take 5 mg by mouth daily as needed for Constipation.      cinacalcet (SENSIPAR) 30 MG Tab Take 30 mg by mouth daily with breakfast.      diphenhydrAMINE (BENADRYL) 25 mg capsule Take 1 each (25 mg total) by mouth every 6 (six) hours as  needed for Itching.  0    famotidine (PEPCID) 20 MG tablet Take 20 mg by mouth 2 (two) times daily.      furosemide (LASIX) 20 MG tablet Take 20 mg by mouth 2 (two) times daily.      meclizine (ANTIVERT) 32 MG tablet Take 25 mg by mouth 3 (three) times daily as needed. 1/2 tablet three times daily prn      nystatin (MYCOSTATIN) ointment Apply topically 2 (two) times daily. 15 g 1    sevelamer carbonate (RENVELA) 800 mg Tab Take 800 mg by mouth 3 (three) times daily with meals. 3 tablets with each meal      tamsulosin (FLOMAX) 0.4 mg Cp24 Take 0.4 mg by mouth once daily.       Scheduled Meds:   allopurinol  100 mg Oral Daily    amLODIPine  5 mg Oral Daily    aspirin  325 mg Oral Daily    cinacalcet  30 mg Oral Daily with breakfast    epoetin pinky (PROCRIT) injection  5,000 Units Intravenous Every Mon, Wed, Fri    furosemide  20 mg Oral BID    heparin (porcine)  5,000 Units Subcutaneous Q12H    piperacillin-tazobactam (ZOSYN) IVPB  2.25 g Intravenous Q8H    sevelamer carbonate  800 mg Oral TID WM    tamsulosin  0.4 mg Oral Daily     Continuous Infusions:  PRN Meds:.acetaminophen, bisacodyl, hydrALAZINE, ondansetron, senna-docusate 8.6-50 mg    Allergies:  Patient has no known allergies.    Vital Signs:  Temp Readings from Last 3 Encounters:   05/17/18 97.3 °F (36.3 °C) (Axillary)   05/11/18 98.1 °F (36.7 °C) (Oral)   03/29/17 98.3 °F (36.8 °C) (Oral)       Pulse Readings from Last 3 Encounters:   05/17/18 66   05/11/18 79   07/21/17 81       BP Readings from Last 3 Encounters:   05/17/18 136/66   05/11/18 127/83   07/21/17 (!) 140/74       Weight:  Wt Readings from Last 3 Encounters:   05/14/18 70.2 kg (154 lb 12.2 oz)   05/11/18 70.3 kg (155 lb)   03/29/17 70.3 kg (154 lb 15.7 oz)       Physical Exam:  Constitutional: nad, aao x 1 (at baseline)  Heart: rrr, jessy, no r/g, wwp, no edema  Lungs: ctab, no w/r/r/c, no lb  Abdomen: s/nt/nd, +BS    Results:  Lab Results   Component Value Date     (L)  05/17/2018    K 4.3 05/17/2018    CL 98 05/17/2018    CO2 22 (L) 05/17/2018    BUN 35 (H) 05/17/2018    CREATININE 6.9 (H) 05/17/2018    CALCIUM 10.2 05/17/2018    ANIONGAP 14 05/17/2018    ESTGFRAFRICA 8 (A) 05/17/2018    EGFRNONAA 7 (A) 05/17/2018       Lab Results   Component Value Date    CALCIUM 10.2 05/17/2018    PHOS 5.4 (H) 05/17/2018         Recent Labs  Lab 05/17/18  0602   WBC 4.30   RBC 4.05*   HGB 11.4*   HCT 34.5*      MCV 85   MCH 28.2   MCHC 33.1       I have personally reviewed pertinent radiological imaging and reports.    Chad Desai MD MPH  [facility]  [address] [place] [address]  [place] [address]  [phone] (p)  [phone] (f)

## 2018-05-18 LAB
ANION GAP SERPL CALC-SCNC: 13 MMOL/L
BASOPHILS # BLD AUTO: 0 K/UL
BASOPHILS NFR BLD: 0.7 %
BUN SERPL-MCNC: 50 MG/DL
CALCIUM SERPL-MCNC: 9.1 MG/DL
CHLORIDE SERPL-SCNC: 93 MMOL/L
CO2 SERPL-SCNC: 21 MMOL/L
CREAT SERPL-MCNC: 8.6 MG/DL
DIFFERENTIAL METHOD: ABNORMAL
EOSINOPHIL # BLD AUTO: 0.1 K/UL
EOSINOPHIL NFR BLD: 2.5 %
ERYTHROCYTE [DISTWIDTH] IN BLOOD BY AUTOMATED COUNT: 15.9 %
EST. GFR  (AFRICAN AMERICAN): 6 ML/MIN/1.73 M^2
EST. GFR  (NON AFRICAN AMERICAN): 5 ML/MIN/1.73 M^2
GLUCOSE SERPL-MCNC: 98 MG/DL
HCT VFR BLD AUTO: 32.2 %
HGB BLD-MCNC: 10.6 G/DL
LYMPHOCYTES # BLD AUTO: 0.8 K/UL
LYMPHOCYTES NFR BLD: 20.4 %
MAGNESIUM SERPL-MCNC: 2.1 MG/DL
MCH RBC QN AUTO: 27.9 PG
MCHC RBC AUTO-ENTMCNC: 32.9 G/DL
MCV RBC AUTO: 85 FL
MONOCYTES # BLD AUTO: 0.5 K/UL
MONOCYTES NFR BLD: 13.6 %
NEUTROPHILS # BLD AUTO: 2.5 K/UL
NEUTROPHILS NFR BLD: 62.8 %
PHOSPHATE SERPL-MCNC: 5.3 MG/DL
PLATELET # BLD AUTO: 192 K/UL
PMV BLD AUTO: 7.2 FL
POTASSIUM SERPL-SCNC: 4.1 MMOL/L
RBC # BLD AUTO: 3.79 M/UL
SODIUM SERPL-SCNC: 127 MMOL/L
WBC # BLD AUTO: 3.9 K/UL

## 2018-05-18 PROCEDURE — 63600175 PHARM REV CODE 636 W HCPCS: Performed by: NURSE PRACTITIONER

## 2018-05-18 PROCEDURE — 25000003 PHARM REV CODE 250: Performed by: HOSPITALIST

## 2018-05-18 PROCEDURE — 25000003 PHARM REV CODE 250: Performed by: INTERNAL MEDICINE

## 2018-05-18 PROCEDURE — 83735 ASSAY OF MAGNESIUM: CPT

## 2018-05-18 PROCEDURE — 94761 N-INVAS EAR/PLS OXIMETRY MLT: CPT

## 2018-05-18 PROCEDURE — 63600175 PHARM REV CODE 636 W HCPCS: Performed by: INTERNAL MEDICINE

## 2018-05-18 PROCEDURE — 25000003 PHARM REV CODE 250: Performed by: EMERGENCY MEDICINE

## 2018-05-18 PROCEDURE — 80048 BASIC METABOLIC PNL TOTAL CA: CPT

## 2018-05-18 PROCEDURE — 85025 COMPLETE CBC W/AUTO DIFF WBC: CPT

## 2018-05-18 PROCEDURE — 99232 SBSQ HOSP IP/OBS MODERATE 35: CPT | Mod: ,,, | Performed by: INTERNAL MEDICINE

## 2018-05-18 PROCEDURE — 12000002 HC ACUTE/MED SURGE SEMI-PRIVATE ROOM

## 2018-05-18 PROCEDURE — 86580 TB INTRADERMAL TEST: CPT | Performed by: INTERNAL MEDICINE

## 2018-05-18 PROCEDURE — 84100 ASSAY OF PHOSPHORUS: CPT

## 2018-05-18 PROCEDURE — 36415 COLL VENOUS BLD VENIPUNCTURE: CPT

## 2018-05-18 PROCEDURE — 80100016 HC MAINTENANCE HEMODIALYSIS

## 2018-05-18 RX ORDER — ZIPRASIDONE MESYLATE 20 MG/ML
5 INJECTION, POWDER, LYOPHILIZED, FOR SOLUTION INTRAMUSCULAR ONCE
Status: COMPLETED | OUTPATIENT
Start: 2018-05-18 | End: 2018-05-18

## 2018-05-18 RX ADMIN — FUROSEMIDE 20 MG: 20 TABLET ORAL at 09:05

## 2018-05-18 RX ADMIN — SEVELAMER CARBONATE 800 MG: 800 TABLET, FILM COATED ORAL at 12:05

## 2018-05-18 RX ADMIN — Medication 5 UNITS: at 04:05

## 2018-05-18 RX ADMIN — TAMSULOSIN HYDROCHLORIDE 0.4 MG: 0.4 CAPSULE ORAL at 09:05

## 2018-05-18 RX ADMIN — ASPIRIN 325 MG ORAL TABLET 325 MG: 325 PILL ORAL at 09:05

## 2018-05-18 RX ADMIN — ALLOPURINOL 100 MG: 100 TABLET ORAL at 09:05

## 2018-05-18 RX ADMIN — SEVELAMER CARBONATE 800 MG: 800 TABLET, FILM COATED ORAL at 06:05

## 2018-05-18 RX ADMIN — AMLODIPINE BESYLATE 5 MG: 5 TABLET ORAL at 09:05

## 2018-05-18 RX ADMIN — PIPERACILLIN SODIUM AND TAZOBACTAM SODIUM 2.25 G: 2; .25 INJECTION, POWDER, FOR SOLUTION INTRAVENOUS at 12:05

## 2018-05-18 RX ADMIN — PIPERACILLIN SODIUM AND TAZOBACTAM SODIUM 2.25 G: 2; .25 INJECTION, POWDER, FOR SOLUTION INTRAVENOUS at 04:05

## 2018-05-18 RX ADMIN — ERYTHROPOIETIN 5000 UNITS: 20000 INJECTION, SOLUTION INTRAVENOUS; SUBCUTANEOUS at 11:05

## 2018-05-18 RX ADMIN — PIPERACILLIN SODIUM AND TAZOBACTAM SODIUM 2.25 G: 2; .25 INJECTION, POWDER, FOR SOLUTION INTRAVENOUS at 08:05

## 2018-05-18 RX ADMIN — SEVELAMER CARBONATE 800 MG: 800 TABLET, FILM COATED ORAL at 07:05

## 2018-05-18 RX ADMIN — HEPARIN SODIUM 5000 UNITS: 5000 INJECTION, SOLUTION INTRAVENOUS; SUBCUTANEOUS at 08:05

## 2018-05-18 RX ADMIN — CINACALCET HYDROCHLORIDE 30 MG: 30 TABLET, COATED ORAL at 07:05

## 2018-05-18 RX ADMIN — ZIPRASIDONE MESYLATE 5 MG: 20 INJECTION, POWDER, LYOPHILIZED, FOR SOLUTION INTRAMUSCULAR at 12:05

## 2018-05-18 RX ADMIN — HEPARIN SODIUM 5000 UNITS: 5000 INJECTION, SOLUTION INTRAVENOUS; SUBCUTANEOUS at 10:05

## 2018-05-18 RX ADMIN — FUROSEMIDE 20 MG: 20 TABLET ORAL at 10:05

## 2018-05-18 NOTE — PROGRESS NOTES
[facility] faxed patient information to [employer] for rolling walker.  Fax confirmation received.  [employer] to deliver to hospital room.zeferino, CHLOE

## 2018-05-18 NOTE — PLAN OF CARE
Per Evelyne RN CM, I sent a 3 day packet, current meds, speech and PT [facility]. Liliana Burns LMSW     05/18/18 6726   Discharge Reassessment   Assessment Type Discharge Planning Reassessment

## 2018-05-18 NOTE — PROGRESS NOTES
INPATIENT NEPHROLOGY PROGRESS NOTE  [facility] NEPHROLOGY    Patient Name: Roosevelt Gordon  Date: 05/18/2018    Reason for consultation: ESRD    Chief Complaint:   Chief Complaint   Patient presents with    Altered Mental Status     Missed dialysis today / possibly confused        History of Present Illness:  83 y/o M with ESRD on HD MWF, HTN and dementia who p/w AMS. He missed HD on Monday. Nurse at HD unit asked someone to check on him and he was noted to be confused and was sent to the ER. We have been consulted for dialysis.     · Interval History/Subjective:    - seen on HD  - had to terminate treatment about 20 minutes to due drop in BP with altered mental status; as we were rinsing him back, he perked up and became more responsive    · Review of Systems: Unable to obtain due to baseline dementia    Plan of Care:    Assessment:  AMS  ESRD  HTN  SHPT  Anemia of CKD     Plan:     - Will need to f/u mental status after HD today to make sure he is back to baseline- suspect acute change was 2/2 hypotension.  - Continue regular MWF HD schedule.  - BP/VS are overall improved. Continue UF to 2-3L with HD (probably closer to 2L now since we have made a lot of progress with volume status and he is now dropping BP).  - Phos is at goal. Continue sevelamer and sensipar.  - Anemia is stable. Continue EPO with HD.      D/C per hospitalist.     Thank you for allowing us to participate in this patient's care. We will continue to follow.    Medications:  No current facility-administered medications on file prior to encounter.      Current Outpatient Prescriptions on File Prior to Encounter   Medication Sig Dispense Refill    acetaminophen (TYLENOL) 325 MG tablet Take 2 tablets (650 mg total) by mouth every 8 (eight) hours as needed for Pain or Temperature greater than (100.3).  0    allopurinol (ZYLOPRIM) 100 MG tablet Take 100 mg by mouth once daily.      aspirin 325 MG tablet Take 325 mg by mouth once daily.      bisacodyl  (DULCOLAX) 5 mg EC tablet Take 5 mg by mouth daily as needed for Constipation.      cinacalcet (SENSIPAR) 30 MG Tab Take 30 mg by mouth daily with breakfast.      diphenhydrAMINE (BENADRYL) 25 mg capsule Take 1 each (25 mg total) by mouth every 6 (six) hours as needed for Itching.  0    famotidine (PEPCID) 20 MG tablet Take 20 mg by mouth 2 (two) times daily.      furosemide (LASIX) 20 MG tablet Take 20 mg by mouth 2 (two) times daily.      meclizine (ANTIVERT) 32 MG tablet Take 25 mg by mouth 3 (three) times daily as needed. 1/2 tablet three times daily prn      nystatin (MYCOSTATIN) ointment Apply topically 2 (two) times daily. 15 g 1    sevelamer carbonate (RENVELA) 800 mg Tab Take 800 mg by mouth 3 (three) times daily with meals. 3 tablets with each meal      tamsulosin (FLOMAX) 0.4 mg Cp24 Take 0.4 mg by mouth once daily.       Scheduled Meds:   allopurinol  100 mg Oral Daily    amLODIPine  5 mg Oral Daily    aspirin  325 mg Oral Daily    cinacalcet  30 mg Oral Daily with breakfast    epoetin pinky (PROCRIT) injection  5,000 Units Intravenous Every Mon, Wed, Fri    furosemide  20 mg Oral BID    heparin (porcine)  5,000 Units Subcutaneous Q12H    piperacillin-tazobactam (ZOSYN) IVPB  2.25 g Intravenous Q8H    sevelamer carbonate  800 mg Oral TID WM    tamsulosin  0.4 mg Oral Daily     Continuous Infusions:  PRN Meds:.acetaminophen, bisacodyl, hydrALAZINE, ondansetron, senna-docusate 8.6-50 mg    Allergies:  Patient has no known allergies.    Vital Signs:  Temp Readings from Last 3 Encounters:   05/18/18 97.5 °F (36.4 °C)   05/11/18 98.1 °F (36.7 °C) (Oral)   03/29/17 98.3 °F (36.8 °C) (Oral)       Pulse Readings from Last 3 Encounters:   05/18/18 86   05/11/18 79   07/21/17 81       BP Readings from Last 3 Encounters:   05/18/18 118/74   05/11/18 127/83   07/21/17 (!) 140/74       Weight:  Wt Readings from Last 3 Encounters:   05/14/18 70.2 kg (154 lb 12.2 oz)   05/11/18 70.3 kg (155 lb)    03/29/17 70.3 kg (154 lb 15.7 oz)       Physical Exam:  Constitutional: nad, aao x 1, not his usual talkative baseline/more lethargic but he says hello and smiles when he sees me  Heart: rrr, jessy, no r/g, wwp, no edema  Lungs: ctab, no w/r/r/c, no lb  Abdomen: s/nt/nd, +BS    Results:  Lab Results   Component Value Date     (L) 05/18/2018    K 4.1 05/18/2018    CL 93 (L) 05/18/2018    CO2 21 (L) 05/18/2018    BUN 50 (H) 05/18/2018    CREATININE 8.6 (H) 05/18/2018    CALCIUM 9.1 05/18/2018    ANIONGAP 13 05/18/2018    ESTGFRAFRICA 6 (A) 05/18/2018    EGFRNONAA 5 (A) 05/18/2018       Lab Results   Component Value Date    CALCIUM 9.1 05/18/2018    PHOS 5.3 (H) 05/18/2018         Recent Labs  Lab 05/18/18  0644   WBC 3.90   RBC 3.79*   HGB 10.6*   HCT 32.2*      MCV 85   MCH 27.9   MCHC 32.9       I have personally reviewed pertinent radiological imaging and reports.    Chad Desai MD MPH  [facility]  [address] [place] [address]  [phone] (p)  [phone] (f)

## 2018-05-18 NOTE — PT/OT/SLP PROGRESS
Physical Therapy      Patient Name:  Roosevelt Gordon   MRN:  [id number]            PT order received and chart reviewed. Attempted to see patient for treatment, however patient currently unable to participate in PT due to out of room. PT will continue to follow and attempt at a later time as schedule permits and patient is appropriate.     Jessica LeJeune, PT, DPT

## 2018-05-18 NOTE — PLAN OF CARE
Spoke with Helga at [facility] (ph#[phone]) to request transfer of services to [place]. She states that I need to contact the [facility] Central Scheduling (ph#[phone]) to make the transfer request.   I spoke with Deanne at [facility] Central Scheduling; gave her the pt's information and transfer request clinic information.   Deanne will fax me the form for the information I need to fax her.   I faxed the requested information to Deanne at [facility] Central Scheduling (fax# [phone]).....Dian Lewis       05/18/18 1003   Discharge Reassessment   Assessment Type Discharge Planning Reassessment

## 2018-05-18 NOTE — PLAN OF CARE
Problem: Patient Care Overview  Goal: Plan of Care Review  Outcome: Ongoing (interventions implemented as appropriate)  Plan of care reviewed with patient. Patient verbalized complete understanding. 2700 cc's removed in HD. SR on telemetry, no ectopy or alarms noted.All fall precautions maintained. Bed in lowest position, locked, call light within reach. Side rails up x's 2. Slip resistant socks maintained.

## 2018-05-18 NOTE — PROGRESS NOTES
Pt is becoming more agitated and yelling out more frequently.  SAULO Frazier was messaged via secure chat and an order for x1 dose of Geodon 5 mg IM was obtained.  Nurse administered Geodon at this time and will monitor pt closely.

## 2018-05-18 NOTE — PROGRESS NOTES
Tx ended. Pt became unresponsive staring off not answering to name.  Blood returned hemostasis achieved.  Pt returned to baseline after rinse back.

## 2018-05-18 NOTE — PROGRESS NOTES
"Progress Note  Hospital Medicine  Patient Name:Roosevelt Gordon  MRN:  [id number]  Patient Class: IP- Inpatient  Admit Date: 5/14/2018  Length of Stay: 3 days  Expected Discharge Date:   Attending Physician: Pipo Brown MD  Primary Care Provider:  Carlos Saucedo MD    SUBJECTIVE:     Principal Problem: Confusion  Initial history of present illness: Patient is a 84 y.o. male admitted to Hospitalist Service from [facility] Emergency Room with complaint of "confusion". Patient reportedly has past medical history significant for ESRD (on HD M/W/F), dementia and hypertension. Part of the history obtained from patient's son. He confirmed frequent son downing episodes as well. Patient lives alone. Patient presented with complaint of altered mental status. Patient received a call from his dialysis nurse after missing a treatment today. Nurse reportedly called EMS because patient informed her that he was too weak to get out of bed. Patient's last treatment was on Friday. Patient denied chest pain, shortness of breath, abdominal pain, nausea, vomiting, headache, vision changes, focal neuro-deficits, cough or fever. Patient is pleasantly confused. No acute distress.    PMH/PSH/SH/FH/Meds: reviewed.    Symptoms/Review of Systems:  Getting HD. Patient is with much improved mentation. A+O x2. Reports less Coughing. No shortness of breath, chest pain or headache, fever or abdominal pain.     Diet: pureed textures & thin liquids, meds crushed in puree  Activity level: Up with assistance  Pain:  Patient reports no pain.       OBJECTIVE:   Vital Signs (Most Recent):      Temp: 97.5 °F (36.4 °C) (05/18/18 0900)  Pulse: 76 (05/18/18 0900)  Resp: 18 (05/18/18 0900)  BP: (!) 146/55 (05/18/18 0900)  SpO2: 97 % (05/17/18 1937)       Vital Signs Range (Last 24H):  Temp:  [97.5 °F (36.4 °C)-97.7 °F (36.5 °C)]   Pulse:  [60-76]   Resp:  [18]   BP: (104-146)/(55)   SpO2:  [97 %]     Weight: 70.2 kg (154 lb 12.2 oz)  Body mass index is " 22.21 kg/m².    Intake/Output Summary (Last 24 hours) at 05/18/18 0944  Last data filed at 05/17/18 1600   Gross per 24 hour   Intake             1100 ml   Output                0 ml   Net             1100 ml     Physical Examination:  General appearance: well developed, appears stated age  Head: normocephalic, atraumatic  Eyes:  conjunctivae/corneas clear. PERRL.  Nose: Nares normal. Septum midline.  Throat: lips, mucosa, and tongue normal; teeth and gums normal, no throat erythema.  Neck: supple, symmetrical, trachea midline, no JVD and thyroid not enlarged, symmetric, no tenderness/mass/nodules  Lungs:  clear to auscultation bilaterally and normal respiratory effort  Chest wall: no tenderness. Right sided PM.  Heart: regular rate and rhythm, S1, S2 normal, no murmur, click, rub or gallop  Abdomen: soft, non-tender non-distented; bowel sounds normal; no masses,  no organomegaly  Extremities: no cyanosis, clubbing or edema. LUE AV fistula with good thrill.    Pulses: 2+ and symmetric  Skin: Skin color, texture, turgor normal. No rashes or lesions.  Lymph nodes: Cervical, supraclavicular, and axillary nodes normal.  Neurologic: Grossly non-focal. Pleasantly confused. Oriented to self only and brother.    CBC:    Recent Labs  Lab 05/16/18  0508 05/17/18  0602 05/18/18  0644   WBC 4.50 4.30 3.90   RBC 3.83* 4.05* 3.79*   HGB 10.8* 11.4* 10.6*   HCT 32.7* 34.5* 32.2*    200 192   MCV 85 85 85   MCH 28.3 28.2 27.9   MCHC 33.1 33.1 32.9   BMP    Recent Labs  Lab 05/16/18  0508 05/17/18  0601 05/18/18  0643    96 98    134* 127*   K 4.9 4.3 4.1    98 93*   CO2 22* 22* 21*   BUN 39* 35* 50*   CREATININE 8.5* 6.9* 8.6*   CALCIUM 10.3 10.2 9.1   MG 2.3 2.2 2.1      Diagnostic Results:  Microbiology Results (last 7 days)     Procedure Component Value Units Date/Time    Blood Culture #2 [[id number]] Collected:  05/14/18 1901    Order Status:  Completed Specimen:  Blood from Antecubital, Right Updated:   05/18/18 0612     Blood Culture, Routine No Growth to date     Blood Culture, Routine No Growth to date     Blood Culture, Routine No Growth to date     Blood Culture, Routine No Growth to date    Blood Culture #1 [[id number]] Collected:  05/14/18 1845    Order Status:  Completed Specimen:  Blood from Antecubital, Left Updated:  05/18/18 0612     Blood Culture, Routine No Growth to date     Blood Culture, Routine No Growth to date     Blood Culture, Routine No Growth to date     Blood Culture, Routine No Growth to date         Chest X-Ray: No acute process.  Cardiomegaly and right cardiac pacer.     CT head: No acute intracranial process.  No significant change.  Mild chronic white matter change.    CXR: New bibasilar infiltrates right more so than left.    CXR: Cardiomegaly and mild bilateral pulmonary edema/CHF pattern.  Bibasilar atelectasis.  Similar appearance to the chest compared to 05/16/2018.    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 May require Psych evaluation.  Use PRN anti-psychotic.  Follow microbiology results.  Observe fall precautions and neuro-checks.  Physical therapy.    Aspiration Pneumonia  Supplemental O2 via nasal canula; titrate O2 saturation to >92%.   Continue beta 2 agonist bronchodilator treatments.   Continue IV antibiotics.  Check sputum GS and Cx.   Observe aspiration precautions and continue modified diet recommended by ST.  Continue routine medications as before.       Yes    Secondary hyperparathyroidism [N25.81]  Continue Phosphate binders and Sensipar. Continue renal diet.    Swallow dysfunction  Observe aspiration precautions and continue modified diet recommended by ST.  Needs frequent suctioning.  Continue IV Zosyn for aspiration pneumonitis.      Yes    ESRD (end stage renal disease) [N18.6]  Monitor BUN/SCr. HD as per nephrology.  Monitor I/Os.  Monitor  electrolytes.    Hyperkalemia  Getting HD. Follow telemetry.      Yes    History of pacemaker [Z95.0]  Tele-monitoring.      Not Applicable   Discussed with patient's brother and answered all the questions.      DVT prophylaxis: Heparin 5 K Sq q 12 hrs.    Await SNF placement, d/W CM.    VTE Risk Mitigation         Ordered     heparin (porcine) injection 5,000 Units  Every 12 hours      05/14/18 2047     IP VTE HIGH RISK PATIENT  Once      05/14/18 2047     Place MITA hose  Until discontinued      05/14/18 2047        Pipo Brown MD  Department of Hospital Medicine   [facility]

## 2018-05-18 NOTE — PT/OT/SLP PROGRESS
Speech Language Pathology      Roosevelt Gordon  MRN: [id number]    Patient not seen today secondary to out of room  . Will follow-up 5/19/18.    Yudith Mario, PRAVIN-SLP

## 2018-05-18 NOTE — PLAN OF CARE
Spoke with the pt's brother, Judd to follow up with discharge anticipated for tomorrow. He started asking questions more resources for discharge assistance so he could have more time to get things together. I discussed SNF as an option. Judd would like to place the pt SNF so he will have more time to make home arrangements and the pt will be ambulating better. He does not have a preference for a SNF.   I spoke with Iesha at [facility] requesting to put the transfer request on hold.   I notified Dr Brown and GLADIS Ford of the above plan....DORIS Lewis       05/18/18 0352   Discharge Reassessment   Assessment Type Discharge Planning Reassessment

## 2018-05-19 LAB
ANION GAP SERPL CALC-SCNC: 12 MMOL/L
BASOPHILS # BLD AUTO: 0 K/UL
BASOPHILS NFR BLD: 0.6 %
BUN SERPL-MCNC: 32 MG/DL
CALCIUM SERPL-MCNC: 9.7 MG/DL
CHLORIDE SERPL-SCNC: 96 MMOL/L
CO2 SERPL-SCNC: 25 MMOL/L
CREAT SERPL-MCNC: 6.9 MG/DL
DIFFERENTIAL METHOD: ABNORMAL
EOSINOPHIL # BLD AUTO: 0.1 K/UL
EOSINOPHIL NFR BLD: 2.9 %
ERYTHROCYTE [DISTWIDTH] IN BLOOD BY AUTOMATED COUNT: 15.5 %
EST. GFR  (AFRICAN AMERICAN): 8 ML/MIN/1.73 M^2
EST. GFR  (NON AFRICAN AMERICAN): 7 ML/MIN/1.73 M^2
GLUCOSE SERPL-MCNC: 82 MG/DL
HCT VFR BLD AUTO: 33.8 %
HGB BLD-MCNC: 11.2 G/DL
LYMPHOCYTES # BLD AUTO: 1.5 K/UL
LYMPHOCYTES NFR BLD: 34.5 %
MAGNESIUM SERPL-MCNC: 2.1 MG/DL
MCH RBC QN AUTO: 28.2 PG
MCHC RBC AUTO-ENTMCNC: 33.2 G/DL
MCV RBC AUTO: 85 FL
MONOCYTES # BLD AUTO: 0.7 K/UL
MONOCYTES NFR BLD: 16.7 %
NEUTROPHILS # BLD AUTO: 2 K/UL
NEUTROPHILS NFR BLD: 45.3 %
PHOSPHATE SERPL-MCNC: 5.2 MG/DL
PLATELET # BLD AUTO: 202 K/UL
PMV BLD AUTO: 7.3 FL
POTASSIUM SERPL-SCNC: 4 MMOL/L
RBC # BLD AUTO: 3.98 M/UL
SODIUM SERPL-SCNC: 133 MMOL/L
WBC # BLD AUTO: 4.3 K/UL

## 2018-05-19 PROCEDURE — 25000003 PHARM REV CODE 250: Performed by: INTERNAL MEDICINE

## 2018-05-19 PROCEDURE — 80048 BASIC METABOLIC PNL TOTAL CA: CPT

## 2018-05-19 PROCEDURE — 25000003 PHARM REV CODE 250: Performed by: EMERGENCY MEDICINE

## 2018-05-19 PROCEDURE — 80100016 HC MAINTENANCE HEMODIALYSIS

## 2018-05-19 PROCEDURE — 83735 ASSAY OF MAGNESIUM: CPT

## 2018-05-19 PROCEDURE — 84100 ASSAY OF PHOSPHORUS: CPT

## 2018-05-19 PROCEDURE — 94761 N-INVAS EAR/PLS OXIMETRY MLT: CPT

## 2018-05-19 PROCEDURE — 36415 COLL VENOUS BLD VENIPUNCTURE: CPT

## 2018-05-19 PROCEDURE — 85025 COMPLETE CBC W/AUTO DIFF WBC: CPT

## 2018-05-19 PROCEDURE — 63600175 PHARM REV CODE 636 W HCPCS: Performed by: INTERNAL MEDICINE

## 2018-05-19 PROCEDURE — 12000002 HC ACUTE/MED SURGE SEMI-PRIVATE ROOM

## 2018-05-19 PROCEDURE — 97116 GAIT TRAINING THERAPY: CPT

## 2018-05-19 RX ORDER — HEPARIN SODIUM 5000 [USP'U]/ML
5000 INJECTION, SOLUTION INTRAVENOUS; SUBCUTANEOUS
Status: DISCONTINUED | OUTPATIENT
Start: 2018-05-19 | End: 2018-05-21 | Stop reason: SDUPTHER

## 2018-05-19 RX ORDER — SODIUM CHLORIDE 9 MG/ML
INJECTION, SOLUTION INTRAVENOUS
Status: DISCONTINUED | OUTPATIENT
Start: 2018-05-19 | End: 2018-05-21 | Stop reason: SDUPTHER

## 2018-05-19 RX ORDER — MIDODRINE HYDROCHLORIDE 2.5 MG/1
2.5 TABLET ORAL ONCE
Status: COMPLETED | OUTPATIENT
Start: 2018-05-19 | End: 2018-05-19

## 2018-05-19 RX ORDER — SODIUM CHLORIDE 9 MG/ML
INJECTION, SOLUTION INTRAVENOUS ONCE
Status: DISCONTINUED | OUTPATIENT
Start: 2018-05-19 | End: 2018-05-21 | Stop reason: SDUPTHER

## 2018-05-19 RX ADMIN — ALLOPURINOL 100 MG: 100 TABLET ORAL at 09:05

## 2018-05-19 RX ADMIN — HEPARIN SODIUM 5000 UNITS: 5000 INJECTION, SOLUTION INTRAVENOUS; SUBCUTANEOUS at 09:05

## 2018-05-19 RX ADMIN — CINACALCET HYDROCHLORIDE 30 MG: 30 TABLET, COATED ORAL at 09:05

## 2018-05-19 RX ADMIN — FUROSEMIDE 20 MG: 20 TABLET ORAL at 09:05

## 2018-05-19 RX ADMIN — EPOETIN ALFA 5000 UNITS: 20000 SOLUTION INTRAVENOUS; SUBCUTANEOUS at 03:05

## 2018-05-19 RX ADMIN — MIDODRINE HYDROCHLORIDE 2.5 MG: 2.5 TABLET ORAL at 01:05

## 2018-05-19 RX ADMIN — PIPERACILLIN SODIUM AND TAZOBACTAM SODIUM 2.25 G: 2; .25 INJECTION, POWDER, FOR SOLUTION INTRAVENOUS at 11:05

## 2018-05-19 RX ADMIN — SEVELAMER CARBONATE 800 MG: 800 TABLET, FILM COATED ORAL at 09:05

## 2018-05-19 RX ADMIN — PIPERACILLIN SODIUM AND TAZOBACTAM SODIUM 2.25 G: 2; .25 INJECTION, POWDER, FOR SOLUTION INTRAVENOUS at 09:05

## 2018-05-19 RX ADMIN — ASPIRIN 325 MG ORAL TABLET 325 MG: 325 PILL ORAL at 09:05

## 2018-05-19 RX ADMIN — SEVELAMER CARBONATE 800 MG: 800 TABLET, FILM COATED ORAL at 01:05

## 2018-05-19 RX ADMIN — PIPERACILLIN SODIUM AND TAZOBACTAM SODIUM 2.25 G: 2; .25 INJECTION, POWDER, FOR SOLUTION INTRAVENOUS at 05:05

## 2018-05-19 RX ADMIN — PIPERACILLIN SODIUM AND TAZOBACTAM SODIUM 2.25 G: 2; .25 INJECTION, POWDER, FOR SOLUTION INTRAVENOUS at 12:05

## 2018-05-19 NOTE — PT/OT/SLP PROGRESS
Physical Therapy Treatment     Patient Name: Roosevelt Gordon  MRN:  [id number]     Recommendations:      Cont w ongoing skilled PT     Assessment:      Roosevelt Gordon is a 83 yo  male presents with the following impairments/functional limitations:  weakness, impaired endurance, impaired self care skills, impaired functional mobilty, gait instability, impaired balance, impaired cognition, decreased upper extremity function, decreased lower extremity function, decreased safety awareness mild confusion.     Rehab Prognosis:  good; patient would benefit from acute skilled PT services to address these deficits and reach maximum level of function.       Recent Surgery: * No surgery found *       Plan:      During this hospitalization, patient to be seen 6 x/week to address the above listed problems via gait training, therapeutic activities, therapeutic exercises  § Plan of Care Expires:  05/24/18  · Plan of Care Reviewed with: patient     Subjective      Communicated with pt's nurse Hartmann prior to session.  Patient found sitting in bedside chair upon PT entry to room, agreeable to treatment.       Chief Complaint: wanting to feel better  Patient comments/goals: be at peace  Pain/Comfort:  · Pain Rating 1: 0/10  · Pain Rating Post-Intervention 1: 0/10     Patients cultural, spiritual, Buddhism conflicts given the current situation: none     Objective:      Patient found with: peripheral IV, telemetry      General Precautions: Standard, fall   Orthopedic Precautions:N/A        Functional Mobility:  · Transfers:     · Sit to Stand:  moderate assistance with rolling walker  · Gait: 70 ft from bedside chair to dialysis suite, CGA w PCA tending to IV pole     Therapeutic Activities and Exercises:  Seated marching 3 x 30 sec  Ankle DF/PF 2 x 20 reps  Fit personal RW to proper height (recently delivered to pt room) Reviewed safety w transfers. Pt aware he needs to call nursing to assist w oob mobility  See above for gait  training     Patient left up in chair with nursing notified and dialysis personnel present..           Time Tracking:      PT Received On: 05/19/18  PT Start Time: 1325     PT Stop Time: 1342  PT Total Time (min): 17 min      Billable Minutes: Gait Training 1 unit. 17 min     Treatment Type: Treatment  PT/PTA: PT            Lisha Desai, PT  05/19/2018

## 2018-05-19 NOTE — PROGRESS NOTES
INPATIENT NEPHROLOGY PROGRESS NOTE  [facility] NEPHROLOGY    Patient Name: Roosevelt Gordon  Date: 05/19/2018    Reason for consultation: ESRD    Chief Complaint:   Chief Complaint   Patient presents with    Altered Mental Status     Missed dialysis today / possibly confused        History of Present Illness:  85 y/o M with ESRD on HD MWF, HTN and dementia who p/w AMS. He missed HD on Monday. Nurse at HD unit asked someone to check on him and he was noted to be confused and was sent to the ER. We have been consulted for dialysis.     5/19  Terminated hd yesterday due to low bp.  Still confused this am.  Coughing.  No chest pain    · Interval History/Subjective:         · Review of Systems: Unable to obtain due to baseline dementia    Plan of Care:    Assessment:  AMS  ESRD  HTN  SHPT  Anemia of CKD  Pulmonary edema    Plan:     Repeat dialysis today  Hold norvasc  Midodrine prior to dialysis  uf as tolerated    Thank you for allowing us to participate in this patient's care. We will continue to follow.    Medications:  No current facility-administered medications on file prior to encounter.      Current Outpatient Prescriptions on File Prior to Encounter   Medication Sig Dispense Refill    acetaminophen (TYLENOL) 325 MG tablet Take 2 tablets (650 mg total) by mouth every 8 (eight) hours as needed for Pain or Temperature greater than (100.3).  0    allopurinol (ZYLOPRIM) 100 MG tablet Take 100 mg by mouth once daily.      aspirin 325 MG tablet Take 325 mg by mouth once daily.      bisacodyl (DULCOLAX) 5 mg EC tablet Take 5 mg by mouth daily as needed for Constipation.      cinacalcet (SENSIPAR) 30 MG Tab Take 30 mg by mouth daily with breakfast.      diphenhydrAMINE (BENADRYL) 25 mg capsule Take 1 each (25 mg total) by mouth every 6 (six) hours as needed for Itching.  0    famotidine (PEPCID) 20 MG tablet Take 20 mg by mouth 2 (two) times daily.      furosemide (LASIX) 20 MG tablet Take 20 mg by mouth 2 (two)  times daily.      meclizine (ANTIVERT) 32 MG tablet Take 25 mg by mouth 3 (three) times daily as needed. 1/2 tablet three times daily prn      nystatin (MYCOSTATIN) ointment Apply topically 2 (two) times daily. 15 g 1    sevelamer carbonate (RENVELA) 800 mg Tab Take 800 mg by mouth 3 (three) times daily with meals. 3 tablets with each meal      tamsulosin (FLOMAX) 0.4 mg Cp24 Take 0.4 mg by mouth once daily.       Scheduled Meds:   allopurinol  100 mg Oral Daily    amLODIPine  5 mg Oral Daily    aspirin  325 mg Oral Daily    cinacalcet  30 mg Oral Daily with breakfast    epoetin pinky (PROCRIT) injection  5,000 Units Intravenous Every Mon, Wed, Fri    furosemide  20 mg Oral BID    heparin (porcine)  5,000 Units Subcutaneous Q12H    piperacillin-tazobactam (ZOSYN) IVPB  2.25 g Intravenous Q8H    sevelamer carbonate  800 mg Oral TID WM    tamsulosin  0.4 mg Oral Daily     Continuous Infusions:  PRN Meds:.acetaminophen, bisacodyl, hydrALAZINE, ondansetron, senna-docusate 8.6-50 mg    Allergies:  Patient has no known allergies.    Vital Signs:  Temp Readings from Last 3 Encounters:   05/19/18 98.4 °F (36.9 °C)   05/11/18 98.1 °F (36.7 °C) (Oral)   03/29/17 98.3 °F (36.8 °C) (Oral)       Pulse Readings from Last 3 Encounters:   05/19/18 72   05/11/18 79   07/21/17 81       BP Readings from Last 3 Encounters:   05/19/18 (!) 100/57   05/11/18 127/83   07/21/17 (!) 140/74       Weight:  Wt Readings from Last 3 Encounters:   05/14/18 70.2 kg (154 lb 12.2 oz)   05/11/18 70.3 kg (155 lb)   03/29/17 70.3 kg (154 lb 15.7 oz)       Physical Exam:  Constitutional: nad, aao x 1, not his usual talkative baseline/more lethargic but he says hello and smiles when he sees me  Heart: rrr, jessy, no r/g, wwp, no edema  Lungs: basilar crackles  Abdomen: s/nt/nd, +BS    Results:  Lab Results   Component Value Date     (L) 05/19/2018    K 4.0 05/19/2018    CL 96 05/19/2018    CO2 25 05/19/2018    BUN 32 (H) 05/19/2018     CREATININE 6.9 (H) 05/19/2018    CALCIUM 9.7 05/19/2018    ANIONGAP 12 05/19/2018    ESTGFRAFRICA 8 (A) 05/19/2018    EGFRNONAA 7 (A) 05/19/2018       Lab Results   Component Value Date    CALCIUM 9.7 05/19/2018    PHOS 5.2 (H) 05/19/2018         Recent Labs  Lab 05/19/18  0448   WBC 4.30   RBC 3.98*   HGB 11.2*   HCT 33.8*      MCV 85   MCH 28.2   MCHC 33.2       I have personally reviewed pertinent radiological imaging and reports.    Chad Desai MD MPH  [facility] [place]  [address] [place] [id number]  [phone] (p)  [phone] (f)

## 2018-05-19 NOTE — PROGRESS NOTES
05/19/18 1715   Handoff Report   Given To Christine   Vital Signs   Temp 98 °F (36.7 °C)   Pulse 70   Resp 18   /66   Post-Hemodialysis Assessment   Rinseback Volume (mL) 250 mL   Blood Volume Processed (Liters) 54 L   Dialyzer Clearance Moderately streaked   Duration of Treatment (minutes) 180 minutes   Hemodialysis Intake (mL) 500 mL   Total UF (mL) 3500 mL   Net Fluid Removal 3000   Patient Response to Treatment tolerated well   Post-Treatment Weight 61 kg (134 lb 7.7 oz)   Treatment Weight Change -3   Arterial bleeding stop time (min) 6 min   Venous bleeding stop time (min) 6 min   Post-Hemodialysis Comments needles pulled without problem, no bleeding

## 2018-05-19 NOTE — PLAN OF CARE
Problem: Patient Care Overview  Goal: Plan of Care Review  Outcome: Ongoing (interventions implemented as appropriate)  Patient disoriented to time. Denies pain throughout day. Plan of care reviewed with patient, verbalized understanding. Received dialysis today, tolerated well. Ambulated in [place] with PT. Up in chair throughout day. IV antibiotics given. Remained free from fall and injury. Bed in lowest position, call light within reach, room near nurses station and telesitter monitor on patient.

## 2018-05-19 NOTE — PROGRESS NOTES
Progress Note    Admit Date: 5/14/2018   LOS: 4 days     SUBJECTIVE:     Interval history:  Currently patient is without complaints.  No acute events reported overnight.  The has had dialysis done today.    Scheduled Meds:   sodium chloride 0.9%   Intravenous Once    allopurinol  100 mg Oral Daily    aspirin  325 mg Oral Daily    cinacalcet  30 mg Oral Daily with breakfast    epoetin pinky (PROCRIT) injection  5,000 Units Intravenous Once    epoetin pinky (PROCRIT) injection  5,000 Units Intravenous Every Mon, Wed, Fri    furosemide  20 mg Oral BID    heparin (porcine)  5,000 Units Subcutaneous Q12H    piperacillin-tazobactam (ZOSYN) IVPB  2.25 g Intravenous Q8H    sevelamer carbonate  800 mg Oral TID WM     Continuous Infusions:  PRN Meds:sodium chloride 0.9%, acetaminophen, bisacodyl, heparin (porcine), hydrALAZINE, ondansetron, senna-docusate 8.6-50 mg    Review of patient's allergies indicates:  No Known Allergies    Review of Systems  Negative for shortness of breath/chest pain.    OBJECTIVE:     Vital Signs (Most Recent)  Temp: 97.1 °F (36.2 °C) (05/19/18 1147)  Pulse: 79 (05/19/18 1147)  Resp: 16 (05/19/18 1147)  BP: (!) 113/56 (05/19/18 1147)  SpO2: 100 % (05/19/18 1147)    Vital Signs Range (Last 24H):  Temp:  [97 °F (36.1 °C)-98.4 °F (36.9 °C)]   Pulse:  [68-80]   Resp:  [16-20]   BP: (100-135)/(55-63)   SpO2:  [96 %-100 %]     I & O (Last 24H):  Intake/Output Summary (Last 24 hours) at 05/19/18 1518  Last data filed at 05/19/18 0700   Gross per 24 hour   Intake             1030 ml   Output                0 ml   Net             1030 ml     Physical Exam:  General appearance: well developed, appears stated age  Lungs:  clear to auscultation bilaterally and normal respiratory effort  Chest wall: no tenderness. Right sided PM.  Heart: regular rate and rhythm, S1, S2 normal, no murmur, click, rub or gallop  Abdomen: soft, non-tender non-distented; bowel sounds normal; no masses,  no  organomegaly  Extremities: no cyanosis, clubbing or edema. LUE AV fistula with good thrill.      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  Follow microbiology results.  Observe fall precautions and neuro-checks.  Physical therapy.     Aspiration Pneumonia  Supplemental O2 via nasal canula; titrate O2 saturation to >92%.   Continue beta 2 agonist bronchodilator treatments.   Continue IV antibiotics.  Check sputum GS and Cx.   Observe aspiration precautions and continue modified diet recommended by ST.  Continue routine medications as before.       Yes    Secondary hyperparathyroidism [N25.81]  Continue Phosphate binders and Sensipar. Continue renal diet.     Swallow dysfunction  Observe aspiration precautions and continue modified diet recommended by ST.  Needs frequent suctioning.  Continue IV Zosyn for aspiration pneumonitis.      Yes    ESRD (end stage renal disease) [N18.6]  Monitor BUN/SCr. HD as per nephrology.  Monitor I/Os.  Monitor electrolytes.     Hyperkalemia  Getting HD. Follow telemetry.      Yes    History of pacemaker [Z95.0]  Tele-monitoring.      Not Applicable   Discussed with patient's brother and answered all the questions.      DVT prophylaxis: Heparin 5 K Sq q 12 hrs.     Await SNF placement, d/W CM.           VTE Risk Mitigation          Ordered       heparin (porcine) injection 5,000 Units  Every 12 hours      05/14/18 2047       IP VTE HIGH RISK PATIENT  Once      05/14/18 2047       Place MITA hose  Until discontinued      05/14/18 2047       Portions of this note were created using Dragon voice recognition software. There may be  voice recognition errors found in the text, and attempts were made to correct these errors prior to signature    Hiram Dyer MD    Family Medicine  5/19/2018

## 2018-05-19 NOTE — PLAN OF CARE
05/19/18 0741   Patient Assessment/Suction   Level of Consciousness (AVPU) alert   All Lung Fields Breath Sounds clear   PRE-TX-O2-ETCO2   O2 Device (Oxygen Therapy) room air   SpO2 99 %   Pulse Oximetry Type Intermittent   $ Pulse Oximetry - Multiple Charge Pulse Oximetry - Multiple   Pulse 72   Resp 16

## 2018-05-20 LAB
ANION GAP SERPL CALC-SCNC: 12 MMOL/L
BACTERIA BLD CULT: NORMAL
BACTERIA BLD CULT: NORMAL
BASOPHILS # BLD AUTO: 0 K/UL
BASOPHILS NFR BLD: 1.2 %
BUN SERPL-MCNC: 19 MG/DL
CALCIUM SERPL-MCNC: 9.5 MG/DL
CHLORIDE SERPL-SCNC: 101 MMOL/L
CO2 SERPL-SCNC: 22 MMOL/L
CREAT SERPL-MCNC: 5.7 MG/DL
DIFFERENTIAL METHOD: ABNORMAL
EOSINOPHIL # BLD AUTO: 0.1 K/UL
EOSINOPHIL NFR BLD: 3.2 %
ERYTHROCYTE [DISTWIDTH] IN BLOOD BY AUTOMATED COUNT: 15.6 %
EST. GFR  (AFRICAN AMERICAN): 10 ML/MIN/1.73 M^2
EST. GFR  (NON AFRICAN AMERICAN): 8 ML/MIN/1.73 M^2
GLUCOSE SERPL-MCNC: 147 MG/DL
HCT VFR BLD AUTO: 34.7 %
HGB BLD-MCNC: 11.2 G/DL
LYMPHOCYTES # BLD AUTO: 1.1 K/UL
LYMPHOCYTES NFR BLD: 26.5 %
MAGNESIUM SERPL-MCNC: 2 MG/DL
MCH RBC QN AUTO: 28.1 PG
MCHC RBC AUTO-ENTMCNC: 32.4 G/DL
MCV RBC AUTO: 87 FL
MONOCYTES # BLD AUTO: 0.6 K/UL
MONOCYTES NFR BLD: 13.8 %
NEUTROPHILS # BLD AUTO: 2.4 K/UL
NEUTROPHILS NFR BLD: 55.3 %
PHOSPHATE SERPL-MCNC: 4.2 MG/DL
PLATELET # BLD AUTO: 206 K/UL
PMV BLD AUTO: 7.2 FL
POTASSIUM SERPL-SCNC: 4 MMOL/L
RBC # BLD AUTO: 3.99 M/UL
SODIUM SERPL-SCNC: 135 MMOL/L
WBC # BLD AUTO: 4.3 K/UL

## 2018-05-20 PROCEDURE — 25000003 PHARM REV CODE 250: Performed by: NURSE PRACTITIONER

## 2018-05-20 PROCEDURE — 80048 BASIC METABOLIC PNL TOTAL CA: CPT

## 2018-05-20 PROCEDURE — 25000003 PHARM REV CODE 250: Performed by: INTERNAL MEDICINE

## 2018-05-20 PROCEDURE — 36415 COLL VENOUS BLD VENIPUNCTURE: CPT

## 2018-05-20 PROCEDURE — 94761 N-INVAS EAR/PLS OXIMETRY MLT: CPT

## 2018-05-20 PROCEDURE — 85025 COMPLETE CBC W/AUTO DIFF WBC: CPT

## 2018-05-20 PROCEDURE — 12000002 HC ACUTE/MED SURGE SEMI-PRIVATE ROOM

## 2018-05-20 PROCEDURE — 83735 ASSAY OF MAGNESIUM: CPT

## 2018-05-20 PROCEDURE — 63600175 PHARM REV CODE 636 W HCPCS: Performed by: INTERNAL MEDICINE

## 2018-05-20 PROCEDURE — 84100 ASSAY OF PHOSPHORUS: CPT

## 2018-05-20 RX ORDER — RAMELTEON 8 MG/1
8 TABLET ORAL NIGHTLY PRN
Status: DISCONTINUED | OUTPATIENT
Start: 2018-05-20 | End: 2018-05-21 | Stop reason: HOSPADM

## 2018-05-20 RX ADMIN — CINACALCET HYDROCHLORIDE 30 MG: 30 TABLET, COATED ORAL at 08:05

## 2018-05-20 RX ADMIN — FUROSEMIDE 20 MG: 20 TABLET ORAL at 09:05

## 2018-05-20 RX ADMIN — ALLOPURINOL 100 MG: 100 TABLET ORAL at 08:05

## 2018-05-20 RX ADMIN — HEPARIN SODIUM 5000 UNITS: 5000 INJECTION, SOLUTION INTRAVENOUS; SUBCUTANEOUS at 08:05

## 2018-05-20 RX ADMIN — ASPIRIN 325 MG ORAL TABLET 325 MG: 325 PILL ORAL at 08:05

## 2018-05-20 RX ADMIN — FUROSEMIDE 20 MG: 20 TABLET ORAL at 08:05

## 2018-05-20 RX ADMIN — RAMELTEON 8 MG: 8 TABLET, FILM COATED ORAL at 01:05

## 2018-05-20 RX ADMIN — PIPERACILLIN SODIUM AND TAZOBACTAM SODIUM 2.25 G: 2; .25 INJECTION, POWDER, FOR SOLUTION INTRAVENOUS at 06:05

## 2018-05-20 RX ADMIN — HEPARIN SODIUM 5000 UNITS: 5000 INJECTION, SOLUTION INTRAVENOUS; SUBCUTANEOUS at 09:05

## 2018-05-20 RX ADMIN — PIPERACILLIN SODIUM AND TAZOBACTAM SODIUM 2.25 G: 2; .25 INJECTION, POWDER, FOR SOLUTION INTRAVENOUS at 08:05

## 2018-05-20 NOTE — PROGRESS NOTES
INPATIENT NEPHROLOGY PROGRESS NOTE  [facility] NEPHROLOGY    Patient Name: Roosevelt Gordon  Date: 05/20/2018    Reason for consultation: ESRD    Chief Complaint:   Chief Complaint   Patient presents with    Altered Mental Status     Missed dialysis today / possibly confused        History of Present Illness:  85 y/o M with ESRD on HD MWF, HTN and dementia who p/w AMS. He missed HD on Monday. Nurse at HD unit asked someone to check on him and he was noted to be confused and was sent to the ER. We have been consulted for dialysis.     5/19  Terminated hd yesterday due to low bp.  Still confused this am.  Coughing.  No chest pain  5/20  Had dialysis yesterday.  Still confused.  No sob.  Coughing less           · Review of Systems: Unable to obtain due to baseline dementia    Plan of Care:    Assessment:  AMS  ESRD  HTN  SHPT  Anemia of CKD  Pulmonary edema    Plan:     mwf hd  held norvasc  Midodrine prior to dialysis if holding dialysis not effective  uf as tolerated    Thank you for allowing us to participate in this patient's care. We will continue to follow.    Medications:  No current facility-administered medications on file prior to encounter.      Current Outpatient Prescriptions on File Prior to Encounter   Medication Sig Dispense Refill    acetaminophen (TYLENOL) 325 MG tablet Take 2 tablets (650 mg total) by mouth every 8 (eight) hours as needed for Pain or Temperature greater than (100.3).  0    allopurinol (ZYLOPRIM) 100 MG tablet Take 100 mg by mouth once daily.      aspirin 325 MG tablet Take 325 mg by mouth once daily.      bisacodyl (DULCOLAX) 5 mg EC tablet Take 5 mg by mouth daily as needed for Constipation.      cinacalcet (SENSIPAR) 30 MG Tab Take 30 mg by mouth daily with breakfast.      diphenhydrAMINE (BENADRYL) 25 mg capsule Take 1 each (25 mg total) by mouth every 6 (six) hours as needed for Itching.  0    famotidine (PEPCID) 20 MG tablet Take 20 mg by mouth 2 (two) times daily.       furosemide (LASIX) 20 MG tablet Take 20 mg by mouth 2 (two) times daily.      meclizine (ANTIVERT) 32 MG tablet Take 25 mg by mouth 3 (three) times daily as needed. 1/2 tablet three times daily prn      nystatin (MYCOSTATIN) ointment Apply topically 2 (two) times daily. 15 g 1    sevelamer carbonate (RENVELA) 800 mg Tab Take 800 mg by mouth 3 (three) times daily with meals. 3 tablets with each meal      tamsulosin (FLOMAX) 0.4 mg Cp24 Take 0.4 mg by mouth once daily.       Scheduled Meds:   sodium chloride 0.9%   Intravenous Once    allopurinol  100 mg Oral Daily    aspirin  325 mg Oral Daily    cinacalcet  30 mg Oral Daily with breakfast    epoetin pinky (PROCRIT) injection  5,000 Units Intravenous Every Mon, Wed, Fri    furosemide  20 mg Oral BID    heparin (porcine)  5,000 Units Subcutaneous Q12H    piperacillin-tazobactam (ZOSYN) IVPB  2.25 g Intravenous Q8H    sevelamer carbonate  800 mg Oral TID WM     Continuous Infusions:  PRN Meds:.sodium chloride 0.9%, acetaminophen, bisacodyl, heparin (porcine), hydrALAZINE, ondansetron, ramelteon, senna-docusate 8.6-50 mg    Allergies:  Patient has no known allergies.    Vital Signs:  Temp Readings from Last 3 Encounters:   05/20/18 96.4 °F (35.8 °C) (Oral)   05/11/18 98.1 °F (36.7 °C) (Oral)   03/29/17 98.3 °F (36.8 °C) (Oral)       Pulse Readings from Last 3 Encounters:   05/20/18 77   05/11/18 79   07/21/17 81       BP Readings from Last 3 Encounters:   05/20/18 (!) 110/55   05/11/18 127/83   07/21/17 (!) 140/74       Weight:  Wt Readings from Last 3 Encounters:   05/14/18 70.2 kg (154 lb 12.2 oz)   05/11/18 70.3 kg (155 lb)   03/29/17 70.3 kg (154 lb 15.7 oz)       Physical Exam:  Constitutional: nad, aao x 1, not his usual talkative baseline/more lethargic but he says hello and smiles when he sees me  Heart: rrr, jessy, no r/g, wwp, no edema  Lungs: basilar crackles  Abdomen: s/nt/nd, +BS    Results:  Lab Results   Component Value Date     (L)  05/20/2018    K 4.0 05/20/2018     05/20/2018    CO2 22 (L) 05/20/2018    BUN 19 05/20/2018    CREATININE 5.7 (H) 05/20/2018    CALCIUM 9.5 05/20/2018    ANIONGAP 12 05/20/2018    ESTGFRAFRICA 10 (A) 05/20/2018    EGFRNONAA 8 (A) 05/20/2018       Lab Results   Component Value Date    CALCIUM 9.5 05/20/2018    PHOS 4.2 05/20/2018         Recent Labs  Lab 05/20/18  0459   WBC 4.30   RBC 3.99*   HGB 11.2*   HCT 34.7*      MCV 87   MCH 28.1   MCHC 32.4       I have personally reviewed pertinent radiological imaging and reports.    Carlos Saucedo MD  Nephrology  [facility]  [phone]

## 2018-05-20 NOTE — PROGRESS NOTES
Progress Note    Admit Date: 5/14/2018   LOS: 5 days     SUBJECTIVE:     Interval history:  Patient has not been observed to have any acute events overnight.  On HD, yesterday, patient noted to have hypotension and HD was discontinued at that time.  Patient did have some mental status changes associated with this.  Currently, patient is without complaints.    Scheduled Meds:   sodium chloride 0.9%   Intravenous Once    allopurinol  100 mg Oral Daily    aspirin  325 mg Oral Daily    cinacalcet  30 mg Oral Daily with breakfast    epoetin pinky (PROCRIT) injection  5,000 Units Intravenous Every Mon, Wed, Fri    furosemide  20 mg Oral BID    heparin (porcine)  5,000 Units Subcutaneous Q12H    piperacillin-tazobactam (ZOSYN) IVPB  2.25 g Intravenous Q8H    sevelamer carbonate  800 mg Oral TID WM     Continuous Infusions:  PRN Meds:sodium chloride 0.9%, acetaminophen, bisacodyl, heparin (porcine), hydrALAZINE, ondansetron, ramelteon, senna-docusate 8.6-50 mg    Review of patient's allergies indicates:  No Known Allergies    Review of Systems  Negative for shortness of breath/chest pain.    OBJECTIVE:     Vital Signs (Most Recent)  Temp: 97.3 °F (36.3 °C) (05/20/18 1132)  Pulse: 76 (05/20/18 1132)  Resp: 18 (05/20/18 1132)  BP: (!) 107/59 (05/20/18 1132)  SpO2: 100 % (05/20/18 1132)    Vital Signs Range (Last 24H):  Temp:  [96.4 °F (35.8 °C)-98 °F (36.7 °C)]   Pulse:  [59-80]   Resp:  [18]   BP: (102-121)/(54-70)   SpO2:  [97 %-100 %]     I & O (Last 24H):  Intake/Output Summary (Last 24 hours) at 05/20/18 1221  Last data filed at 05/20/18 0500   Gross per 24 hour   Intake             1200 ml   Output             3500 ml   Net            -2300 ml     Physical Exam:  General appearance: well developed, appears stated age  Lungs:  clear to auscultation bilaterally and normal respiratory effort  Chest wall: no tenderness. Right sided PM.  Heart: regular rate and rhythm, S1, S2 normal, no murmur, click, rub or  gallop  Abdomen: soft, non-tender non-distented; bowel sounds normal; no masses,  no organomegaly  Extremities: no cyanosis, clubbing or edema. LUE AV fistula with good thrill.         Laboratory:  CBC:   Recent Labs  Lab 05/20/18  0459   WBC 4.30   RBC 3.99*   HGB 11.2*   HCT 34.7*      MCV 87   MCH 28.1   MCHC 32.4     BMP:   Recent Labs  Lab 05/20/18  0459   *   *   K 4.0      CO2 22*   BUN 19   CREATININE 5.7*   CALCIUM 9.5   MG 2.0     CMP:   Recent Labs  Lab 05/14/18  1618  05/20/18  0459     < > 147*   CALCIUM 9.9  < > 9.5   ALBUMIN 3.5  --   --    PROT 7.9  --   --      < > 135*   K 5.1  < > 4.0   CO2 24  < > 22*     < > 101   BUN 56*  < > 19   CREATININE 11.6*  < > 5.7*   ALKPHOS 114  --   --    ALT 20  --   --    AST 13  --   --    BILITOT 0.6  --   --    < > = values in this interval not displayed.      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  Follow microbiology results.  Observe fall precautions and neuro-checks.  Physical therapy.     Aspiration Pneumonia  Supplemental O2 via nasal canula; titrate O2 saturation to >92%.   Continue beta 2 agonist bronchodilator treatments.   Continue Zosyn  Observe aspiration precautions and continue modified diet recommended by ST.  Continue routine medications as before.       Yes    Secondary hyperparathyroidism [N25.81]  Continue Phosphate binders and Sensipar. Continue renal diet.     Swallow dysfunction  Observe aspiration precautions and continue modified diet recommended by ST.  Needs frequent suctioning.  Continue IV Zosyn for aspiration pneumonitis.      Yes    ESRD (end stage renal disease) [N18.6]  Nephrology following.  Holding amlodipine at this time.  Monitor BUN/SCr. HD as per nephrology.  Monitor I/Os.  Monitor electrolytes.     Hyperkalemia  Getting HD. Follow telemetry.      Yes    History of pacemaker  [Z95.0]  Tele-monitoring.      Not Applicable   Discussed with patient's brother and answered all the questions.      DVT prophylaxis: Heparin 5 K Sq q 12 hrs.     Await SNF placement, d/W CM.               VTE Risk Mitigation          Ordered       heparin (porcine) injection 5,000 Units  Every 12 hours      05/14/18 2047       IP VTE HIGH RISK PATIENT  Once      05/14/18 2047       Place MITA hose  Until discontinued      05/14/18 2047      Portions of this note were created using Dragon voice recognition software. There may be voice recognition errors found in the text, and attempts were made to correct these errors prior to signature    Hiram Dyer MD    Family Medicine  5/20/2018

## 2018-05-20 NOTE — PLAN OF CARE
Problem: Patient Care Overview  Goal: Plan of Care Review  Outcome: Ongoing (interventions implemented as appropriate)  Plan of care is on going for this patient. Pt's room is near nurse's station. Bed alarm maintained at all times for pt safety.  No falls or injury. Pt is assisted with turning every 2 hours. Will continue to monitor

## 2018-05-21 VITALS
TEMPERATURE: 98 F | DIASTOLIC BLOOD PRESSURE: 65 MMHG | OXYGEN SATURATION: 97 % | RESPIRATION RATE: 18 BRPM | HEART RATE: 80 BPM | SYSTOLIC BLOOD PRESSURE: 105 MMHG | BODY MASS INDEX: 22.15 KG/M2 | HEIGHT: 70 IN | WEIGHT: 154.75 LBS

## 2018-05-21 LAB
ANION GAP SERPL CALC-SCNC: 12 MMOL/L
BASOPHILS # BLD AUTO: 0 K/UL
BASOPHILS NFR BLD: 0.4 %
BUN SERPL-MCNC: 31 MG/DL
CALCIUM SERPL-MCNC: 9.3 MG/DL
CHLORIDE SERPL-SCNC: 100 MMOL/L
CO2 SERPL-SCNC: 21 MMOL/L
CREAT SERPL-MCNC: 8 MG/DL
DIFFERENTIAL METHOD: ABNORMAL
EOSINOPHIL # BLD AUTO: 0.2 K/UL
EOSINOPHIL NFR BLD: 4.9 %
ERYTHROCYTE [DISTWIDTH] IN BLOOD BY AUTOMATED COUNT: 15.5 %
EST. GFR  (AFRICAN AMERICAN): 6 ML/MIN/1.73 M^2
EST. GFR  (NON AFRICAN AMERICAN): 6 ML/MIN/1.73 M^2
GLUCOSE SERPL-MCNC: 78 MG/DL
HCT VFR BLD AUTO: 33.7 %
HGB BLD-MCNC: 11.1 G/DL
LYMPHOCYTES # BLD AUTO: 1.3 K/UL
LYMPHOCYTES NFR BLD: 30.1 %
MAGNESIUM SERPL-MCNC: 2.3 MG/DL
MCH RBC QN AUTO: 28.1 PG
MCHC RBC AUTO-ENTMCNC: 32.9 G/DL
MCV RBC AUTO: 85 FL
MONOCYTES # BLD AUTO: 0.6 K/UL
MONOCYTES NFR BLD: 14.3 %
NEUTROPHILS # BLD AUTO: 2.2 K/UL
NEUTROPHILS NFR BLD: 50.3 %
PHOSPHATE SERPL-MCNC: 5.9 MG/DL
PLATELET # BLD AUTO: 239 K/UL
PMV BLD AUTO: 7.1 FL
POTASSIUM SERPL-SCNC: 4.4 MMOL/L
RBC # BLD AUTO: 3.95 M/UL
SODIUM SERPL-SCNC: 133 MMOL/L
TB INDURATION 48 - 72 HR READ: 0 MM
WBC # BLD AUTO: 4.4 K/UL

## 2018-05-21 PROCEDURE — 83735 ASSAY OF MAGNESIUM: CPT

## 2018-05-21 PROCEDURE — 80048 BASIC METABOLIC PNL TOTAL CA: CPT

## 2018-05-21 PROCEDURE — 63600175 PHARM REV CODE 636 W HCPCS: Performed by: INTERNAL MEDICINE

## 2018-05-21 PROCEDURE — 25000003 PHARM REV CODE 250: Performed by: INTERNAL MEDICINE

## 2018-05-21 PROCEDURE — 85025 COMPLETE CBC W/AUTO DIFF WBC: CPT

## 2018-05-21 PROCEDURE — 99239 HOSP IP/OBS DSCHRG MGMT >30: CPT | Mod: ,,, | Performed by: INTERNAL MEDICINE

## 2018-05-21 PROCEDURE — 97116 GAIT TRAINING THERAPY: CPT

## 2018-05-21 PROCEDURE — 97110 THERAPEUTIC EXERCISES: CPT

## 2018-05-21 PROCEDURE — 80100016 HC MAINTENANCE HEMODIALYSIS

## 2018-05-21 PROCEDURE — 36415 COLL VENOUS BLD VENIPUNCTURE: CPT

## 2018-05-21 PROCEDURE — 25000003 PHARM REV CODE 250: Performed by: EMERGENCY MEDICINE

## 2018-05-21 PROCEDURE — 84100 ASSAY OF PHOSPHORUS: CPT

## 2018-05-21 PROCEDURE — 94761 N-INVAS EAR/PLS OXIMETRY MLT: CPT

## 2018-05-21 RX ORDER — HEPARIN SODIUM 5000 [USP'U]/ML
5000 INJECTION, SOLUTION INTRAVENOUS; SUBCUTANEOUS
Status: DISCONTINUED | OUTPATIENT
Start: 2018-05-21 | End: 2018-05-21 | Stop reason: HOSPADM

## 2018-05-21 RX ORDER — AMOXICILLIN AND CLAVULANATE POTASSIUM 875; 125 MG/1; MG/1
1 TABLET, FILM COATED ORAL 2 TIMES DAILY
Qty: 14 TABLET | Refills: 0 | Status: SHIPPED | OUTPATIENT
Start: 2018-05-21 | End: 2018-05-28

## 2018-05-21 RX ORDER — SODIUM CHLORIDE 9 MG/ML
INJECTION, SOLUTION INTRAVENOUS ONCE
Status: DISCONTINUED | OUTPATIENT
Start: 2018-05-21 | End: 2018-05-21 | Stop reason: HOSPADM

## 2018-05-21 RX ORDER — SODIUM CHLORIDE 9 MG/ML
INJECTION, SOLUTION INTRAVENOUS
Status: DISCONTINUED | OUTPATIENT
Start: 2018-05-21 | End: 2018-05-21 | Stop reason: HOSPADM

## 2018-05-21 RX ADMIN — CINACALCET HYDROCHLORIDE 30 MG: 30 TABLET, COATED ORAL at 09:05

## 2018-05-21 RX ADMIN — HEPARIN SODIUM 5000 UNITS: 5000 INJECTION, SOLUTION INTRAVENOUS; SUBCUTANEOUS at 09:05

## 2018-05-21 RX ADMIN — ALLOPURINOL 100 MG: 100 TABLET ORAL at 09:05

## 2018-05-21 RX ADMIN — PIPERACILLIN SODIUM AND TAZOBACTAM SODIUM 2.25 G: 2; .25 INJECTION, POWDER, FOR SOLUTION INTRAVENOUS at 11:05

## 2018-05-21 RX ADMIN — EPOETIN ALFA 5000 UNITS: 20000 SOLUTION INTRAVENOUS; SUBCUTANEOUS at 10:05

## 2018-05-21 RX ADMIN — ASPIRIN 325 MG ORAL TABLET 325 MG: 325 PILL ORAL at 09:05

## 2018-05-21 RX ADMIN — PIPERACILLIN SODIUM AND TAZOBACTAM SODIUM 2.25 G: 2; .25 INJECTION, POWDER, FOR SOLUTION INTRAVENOUS at 01:05

## 2018-05-21 RX ADMIN — FUROSEMIDE 20 MG: 20 TABLET ORAL at 09:05

## 2018-05-21 RX ADMIN — SEVELAMER CARBONATE 800 MG: 800 TABLET, FILM COATED ORAL at 09:05

## 2018-05-21 NOTE — PLAN OF CARE
Problem: Patient Care Overview  Goal: Plan of Care Review  Outcome: Ongoing (interventions implemented as appropriate)  Plan of care is ongoing for this patient. Room is near nurse's station for frequent checks. Bed alarm maintained at all times for patient safety. No falls or injury. Pain is monitored every 1 to 2 hours with rounds. Will continue to monitor

## 2018-05-21 NOTE — PROGRESS NOTES
05/21/18 1245   Handoff Report   Given To Sindy   Vital Signs   Temp 98 °F (36.7 °C)   Pulse 80   Resp 18   /65   Post-Hemodialysis Assessment   Rinseback Volume (mL) 250 mL   Blood Volume Processed (Liters) 54 L   Dialyzer Clearance Lightly streaked   Duration of Treatment (minutes) 180 minutes   Hemodialysis Intake (mL) 500 mL   Total UF (mL) 3500 mL   Net Fluid Removal 3000   Patient Response to Treatment tolerated well   Post-Treatment Weight 60.9 kg (134 lb 4.2 oz)   Treatment Weight Change -3   Arterial bleeding stop time (min) 6 min   Venous bleeding stop time (min) 6 min   Post-Hemodialysis Comments needles pulled without problem, no bleeding

## 2018-05-21 NOTE — DISCHARGE SUMMARY
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 male admitted to Hospitalist Service from [facility] Emergency Room with complaint of "confusion". Patient reportedly has past medical history significant for ESRD (on HD M/W/F), dementia and hypertension. Part of the history obtained from patient's son. He confirmed frequent son downing episodes as well. Patient lives alone. Patient presented with complaint of altered mental status. Patient received a call from his dialysis nurse after missing a treatment today. Nurse reportedly called EMS because patient informed her that he was too weak to get out of bed. Patient's last treatment was on Friday. Patient denied chest pain, shortness of breath, abdominal pain, nausea, vomiting, headache, vision changes, focal neuro-deficits, cough or fever. Patient is pleasantly confused. No acute distress. Patient was admitted to Hospitalist medicine service. Patient was treated for aspiration pneumonia with antibiotics. Patient was evaluated by speech therapist and appropriate modified diet recommeded. During hospital stay, patient received routine HD. Patient exhibited occasional psychosis. Patient worked with PT and OT. Patient was discharged to SNF in stable condition with following discharge plan of care. Total time with the patient was 30 minutes and greater than 50% was spent in counseling and coordination of care. The assessment and plan have been " discussed at length. Physicians' notes reviewed. Labs and procedure reviewed.     Consults: Dr. Saucedo    Significant Diagnostic Studies:   Chest X-Ray: No acute process.  Cardiomegaly and right cardiac pacer.     CT head: No acute intracranial process.  No significant change.  Mild chronic white matter change.     CXR: New bibasilar infiltrates right more so than left.     CXR: Cardiomegaly and mild bilateral pulmonary edema/CHF pattern.  Bibasilar atelectasis.  Similar appearance to the chest compared to 05/16/2018.    Microbiology Results (last 7 days)     Procedure Component Value Units Date/Time    Blood Culture #1 [[id number]] Collected:  05/14/18 1845    Order Status:  Completed Specimen:  Blood from Antecubital, Left Updated:  05/20/18 0612     Blood Culture, Routine No growth after 5 days.    Blood Culture #2 [[id number]] Collected:  05/14/18 1901    Order Status:  Completed Specimen:  Blood from Antecubital, Right Updated:  05/20/18 0612     Blood Culture, Routine No growth after 5 days.        Special Treatments/Procedures: None  Disposition: SNF  Medications:  Reconciled Home Medications: Current Discharge Medication List      START taking these medications    Details   amoxicillin-clavulanate 875-125mg (AUGMENTIN) 875-125 mg per tablet Take 1 tablet by mouth 2 (two) times daily.  Qty: 14 tablet, Refills: 0         CONTINUE these medications which have NOT CHANGED    Details   acetaminophen (TYLENOL) 325 MG tablet Take 2 tablets (650 mg total) by mouth every 8 (eight) hours as needed for Pain or Temperature greater than (100.3).  Refills: 0      allopurinol (ZYLOPRIM) 100 MG tablet Take 100 mg by mouth once daily.      aspirin 325 MG tablet Take 325 mg by mouth once daily.      bisacodyl (DULCOLAX) 5 mg EC tablet Take 5 mg by mouth daily as needed for Constipation.      cinacalcet (SENSIPAR) 30 MG Tab Take 30 mg by mouth daily with breakfast.      diphenhydrAMINE (BENADRYL) 25 mg capsule Take 1 each (25 mg  total) by mouth every 6 (six) hours as needed for Itching.  Refills: 0      famotidine (PEPCID) 20 MG tablet Take 20 mg by mouth 2 (two) times daily.      furosemide (LASIX) 20 MG tablet Take 20 mg by mouth 2 (two) times daily.      meclizine (ANTIVERT) 32 MG tablet Take 25 mg by mouth 3 (three) times daily as needed. 1/2 tablet three times daily prn      nystatin (MYCOSTATIN) ointment Apply topically 2 (two) times daily.  Qty: 15 g, Refills: 1      sevelamer carbonate (RENVELA) 800 mg Tab Take 800 mg by mouth 3 (three) times daily with meals. 3 tablets with each meal      tamsulosin (FLOMAX) 0.4 mg Cp24 Take 0.4 mg by mouth once daily.         STOP taking these medications       amLODIPine (NORVASC) 5 MG tablet Comments:   Reason for Stopping:               Discharge Procedure Orders  Diet Cardiac- Dysphagia pureed diet     Diet renal     Activity as tolerated   Order Comments: Observe fall precautions.     Call MD for:   Order Comments: For worsening symptoms, chest pain, shortness of breath, increased abdominal pain, high grade fever, stroke or stroke like symptoms, immediately go to the nearest Emergency Room or call 911 as soon as possible.       Follow-up Information     [facility].    Specialty:  DME Provider  Why:  DME  Contact information:  [address] [facility] [address] [phone]             [facility].    Specialties:  SNF Agency, Nursing Home Agency  Why:  SNF, Nursing Home  Contact information:  [address] [address] [id number] [phone]             Carlos Saucedo MD In 2 weeks.    Specialty:  Nephrology  Contact information:  [address]  [facility] NEPHROLOGY [facility] [address] [phone]             Please follow up.    Contact information:  Continue routine HD as before.

## 2018-05-21 NOTE — PROGRESS NOTES
· 5/18/2018 4:47:14 PM Note: Brother coming Monday complete admission paperwork. :)  CHRISTI [email or web address]  · 5/18/2018 3:56:52 PM Note: Yes, can meet needs, Dialysis chair available again and we can transport to [facility]. Thx!!  CHRISTI [email or web address]  Per Christi with [facility], they have accepted the pt and are able to take him today. I updated DORIS Maldonado CM.  Liliana Burns LMSW      I sent the pts PPD and chest xray to [facility]. I left a message with [facility] at [phone] opt 3 to complete the locet assessment. I will await a return call. Liliana Burns LMSW   12:23PM-  I completed the LOCET assessment with Theodore at [phone] opt 3. I faxed the signed and completed PASRR to [facility] at [phone]. I will follow up with obtaining the pts emailed 142.     1:50- I received the pts approved 142 via email and placed a copy in the pts blue chart. Liliana Burns LMSW     2:36- I updated Christi at [facility] [phone] that the pt is discharging today. I faxed the PASRR and 142 to [phone]. Liliana Burns LMSW     2:46 pm- I sent the pts discharge orders and AVS to [facility] via [facility]. Liliana Burns LMSW     · 3:14pm- 5/21/2018 3:12:51 PM Note: Elena newman nurse for report,[phone] give her a few minutes to review. C transport will pickup once report given. Thx!  CHRISTI [email or web address]    I updated the pts nurse, Adilia at [phone].  I selected [facility] as the pts discharge destination via [facility]. Liliana Burns LMSW

## 2018-05-21 NOTE — PT/OT/SLP PROGRESS
Occupational Therapy      Patient Name:  Roosevelt Gordon   MRN:  [id number]    Patient not seen today secondary to Dialysis. Will follow-up when schedule permits and pt is appropriate.    Henrik Barlow, OT  5/21/2018

## 2018-05-21 NOTE — PLAN OF CARE
Problem: Patient Care Overview  Goal: Plan of Care Review  Outcome: Ongoing (interventions implemented as appropriate)  Patient confused off and on throughout day. Irritable. Plan of care reviewed with patient, verbalized understanding. Up in chair with 1 assist. Refused to eat meals due to pureed diet. Remained free from fall and injury. Bed in lowest position and call light within reach. Telesitter monitoring on patient. Vitals stable.

## 2018-05-21 NOTE — PLAN OF CARE
Problem: Patient Care Overview  Goal: Plan of Care Review  Outcome: Ongoing (interventions implemented as appropriate)  Pt is alert, able to answer simple questions and follow simple commands, denies joint pain/discomfort at this time, pt participated with hemodialysis on today, right upper arm fistula is +bruit/thrill, pt enjoys sitting up in chair and conversing with staff, remains afebrile, will continue to monitor, observe and note any changes, safety maintain    1430- PPD negative, 0mm    1630-pt tolerated removal of IV and telemetry, tim olsen and staff is here to retrieve pt, personal belongings packed per staff, report given to alma at [phone]

## 2018-05-21 NOTE — PROGRESS NOTES
INPATIENT NEPHROLOGY PROGRESS NOTE  [facility] NEPHROLOGY    Patient Name: Roosevelt Gordon  Date: 05/21/2018    Reason for consultation: ESRD    Chief Complaint:   Chief Complaint   Patient presents with    Altered Mental Status     Missed dialysis today / possibly confused        History of Present Illness:  83 y/o M with ESRD on HD MWF, HTN and dementia who p/w AMS. He missed HD on Monday. Nurse at HD unit asked someone to check on him and he was noted to be confused and was sent to the ER. We have been consulted for dialysis.     5/19  Terminated hd yesterday due to low bp.  Still confused this am.  Coughing.  No chest pain  5/20  Had dialysis yesterday.  Still confused.  No sob.  Coughing less  5/21 seen on HD machine today, tolerating well. Back to baseline.    Plan of Care:    Assessment:  AMS, resolved  ESRD  HTN  SHPT  Anemia of CKD  Pulmonary edema    Plan:     mwf hd  held norvasc  Midodrine prior to dialysis if holding dialysis not effective  uf as tolerated    Can be dc from renal stand point.    Thank you for allowing us to participate in this patient's care. We will continue to follow.    Medications:  No current facility-administered medications on file prior to encounter.      Current Outpatient Prescriptions on File Prior to Encounter   Medication Sig Dispense Refill    acetaminophen (TYLENOL) 325 MG tablet Take 2 tablets (650 mg total) by mouth every 8 (eight) hours as needed for Pain or Temperature greater than (100.3).  0    allopurinol (ZYLOPRIM) 100 MG tablet Take 100 mg by mouth once daily.      aspirin 325 MG tablet Take 325 mg by mouth once daily.      bisacodyl (DULCOLAX) 5 mg EC tablet Take 5 mg by mouth daily as needed for Constipation.      cinacalcet (SENSIPAR) 30 MG Tab Take 30 mg by mouth daily with breakfast.      diphenhydrAMINE (BENADRYL) 25 mg capsule Take 1 each (25 mg total) by mouth every 6 (six) hours as needed for Itching.  0    famotidine (PEPCID) 20 MG tablet Take 20  mg by mouth 2 (two) times daily.      furosemide (LASIX) 20 MG tablet Take 20 mg by mouth 2 (two) times daily.      meclizine (ANTIVERT) 32 MG tablet Take 25 mg by mouth 3 (three) times daily as needed. 1/2 tablet three times daily prn      nystatin (MYCOSTATIN) ointment Apply topically 2 (two) times daily. 15 g 1    sevelamer carbonate (RENVELA) 800 mg Tab Take 800 mg by mouth 3 (three) times daily with meals. 3 tablets with each meal      tamsulosin (FLOMAX) 0.4 mg Cp24 Take 0.4 mg by mouth once daily.       Scheduled Meds:   sodium chloride 0.9%   Intravenous Once    sodium chloride 0.9%   Intravenous Once    allopurinol  100 mg Oral Daily    aspirin  325 mg Oral Daily    cinacalcet  30 mg Oral Daily with breakfast    epoetin pinky (PROCRIT) injection  5,000 Units Intravenous Every Mon, Wed, Fri    furosemide  20 mg Oral BID    heparin (porcine)  5,000 Units Subcutaneous Q12H    piperacillin-tazobactam (ZOSYN) IVPB  2.25 g Intravenous Q8H    sevelamer carbonate  800 mg Oral TID WM     Continuous Infusions:  PRN Meds:.sodium chloride 0.9%, sodium chloride 0.9%, acetaminophen, bisacodyl, heparin (porcine), hydrALAZINE, ondansetron, ramelteon, senna-docusate 8.6-50 mg    Allergies:  Patient has no known allergies.    Vital Signs:  Temp Readings from Last 3 Encounters:   05/21/18 97 °F (36.1 °C)   05/11/18 98.1 °F (36.7 °C) (Oral)   03/29/17 98.3 °F (36.8 °C) (Oral)       Pulse Readings from Last 3 Encounters:   05/21/18 81   05/11/18 79   07/21/17 81       BP Readings from Last 3 Encounters:   05/21/18 95/65   05/11/18 127/83   07/21/17 (!) 140/74       Weight:  Wt Readings from Last 3 Encounters:   05/14/18 70.2 kg (154 lb 12.2 oz)   05/11/18 70.3 kg (155 lb)   03/29/17 70.3 kg (154 lb 15.7 oz)       Physical Exam:  Constitutional: nad, aao x 1, not his usual talkative baseline/more lethargic but he says hello and smiles when he sees me  Heart: rrr, jessy, no r/g, wwp, no edema  Lungs: basilar  crackles  Abdomen: s/nt/nd, +BS    Results:  Lab Results   Component Value Date     (L) 05/21/2018    K 4.4 05/21/2018     05/21/2018    CO2 21 (L) 05/21/2018    BUN 31 (H) 05/21/2018    CREATININE 8.0 (H) 05/21/2018    CALCIUM 9.3 05/21/2018    ANIONGAP 12 05/21/2018    ESTGFRAFRICA 6 (A) 05/21/2018    EGFRNONAA 6 (A) 05/21/2018       Lab Results   Component Value Date    CALCIUM 9.3 05/21/2018    PHOS 5.9 (H) 05/21/2018         Recent Labs  Lab 05/21/18  0444   WBC 4.40   RBC 3.95*   HGB 11.1*   HCT 33.7*      MCV 85   MCH 28.1   MCHC 32.9       I have personally reviewed pertinent radiological imaging and reports.    Tres Mcarthur MD  Nephrology  [facility] [place]  [phone]

## 2018-05-21 NOTE — PLAN OF CARE
05/21/18 1552   Final Note   Assessment Type Final Discharge Note   Discharge Disposition [facility]

## 2018-05-21 NOTE — PLAN OF CARE
05/21/18 0814   Patient Assessment/Suction   Level of Consciousness (AVPU) alert   Respiratory Effort Normal;Unlabored   PRE-TX-O2-ETCO2   O2 Device (Oxygen Therapy) room air   SpO2 97 %   Pulse 60   Resp 17

## 2018-05-21 NOTE — PLAN OF CARE
Problem: Physical Therapy Goal  Goal: Physical Therapy Goal  Goals to be met by: 2018     Patient will increase functional independence with mobility by performin. Supine to sit with Stand-by Assistance  2. Sit to supine with Stand-by Assistance  3. Sit to stand transfer with Stand-by Assistance  4. Bed to chair transfer with Stand-by Assistance using Rolling Walker  5. Gait  x 150 feet with Stand-by Assistance using Rolling Walker.   6. Lower extremity exercise program x10 reps, with supervision     Outcome: Ongoing (interventions implemented as appropriate)  PT for gait training and therex

## 2018-05-21 NOTE — PT/OT/SLP PROGRESS
Physical Therapy Treatment    Patient Name:  Roosevelt Gordon   MRN:  [id number]    Recommendations:     Discharge Recommendations:  nursing facility, skilled       Assessment:     Roosevelt Gordon is a 84 y.o. male admitted with a medical diagnosis of Confusion.  He presents with the following impairments/functional limitations:  weakness, impaired endurance, impaired self care skills, impaired functional mobilty, gait instability, impaired balance, impaired cognition, decreased lower extremity function, decreased safety awareness . Impaired cognition limiting functional mobility, independence and safety.     Rehab Prognosis:  fair; patient would benefit from acute skilled PT services to address these deficits and reach maximum level of function.      Recent Surgery: * No surgery found *      Plan:     During this hospitalization, patient to be seen 6 x/week to address the above listed problems via gait training, therapeutic activities, therapeutic exercises  · Plan of Care Expires:  06/15/18   Plan of Care Reviewed with: patient    Subjective     Communicated with nurse Pat prior to session.  Patient found seated in chair upon PT entry to room, agreeable to treatment.      Chief Complaint: pt agitated at times.  Patient comments/goals: pt eager to walk and exercise after orienting  Pain/Comfort:  · Pain Rating 1: 0/10 (did not rate)  · Location - Side 1: Right  · Location 1: hip  · Pain Addressed 1: Reposition, Distraction, Cessation of Activity    Patients cultural, spiritual, Congregational conflicts given the current situation: None    Objective:     Patient found with: telemetry, peripheral IV     General Precautions: Standard, aspiration, fall, pureed diet   Orthopedic Precautions:N/A   Braces: N/A     Functional Mobility:  · Transfers:     · Sit to Stand:  moderate assistance with rolling walker    · Gait: 20' with CGA using RW. cueing for upright posture and safety. Increased time required to  initiate        Therapeutic Activities and Exercises:   seated BLE therex: AP, LAQ, marching, hip abd/add x 10-15 reps each    Patient left up in chair with all lines intact, call button in reach, nurse Adilia notified and Andrys and sitter present..    GOALS:    Physical Therapy Goals        Problem: Physical Therapy Goal    Goal Priority Disciplines Outcome Goal Variances Interventions   Physical Therapy Goal     PT/OT, PT Ongoing (interventions implemented as appropriate)     Description:  Goals to be met by: 2018     Patient will increase functional independence with mobility by performin. Supine to sit with Stand-by Assistance  2. Sit to supine with Stand-by Assistance  3. Sit to stand transfer with Stand-by Assistance  4. Bed to chair transfer with Stand-by Assistance using Rolling Walker  5. Gait  x 150 feet with Stand-by Assistance using Rolling Walker.   6. Lower extremity exercise program x10 reps, with supervision                      Time Tracking:     PT Received On: 18  PT Start Time: 1415     PT Stop Time: 1445  PT Total Time (min): 30 min     Billable Minutes: Gait Training 15 and Therapeutic Exercise 10    Treatment Type: Treatment  PT/PTA: PTA     PTA Visit Number: 1     Lizzie Pollack, PTA  2018

## 2018-05-22 NOTE — PHYSICIAN QUERY
PT Name: Roosevelt Gordon  MR #: [id number]     Physician Query Form - Documentation Clarification      CDS/: Valerie Landa               Contact information:[email or web address]    This form is a permanent document in the medical record.     Query Date: May 22, 2018    By submitting this query, we are merely seeking further clarification of documentation. Please utilize your independent clinical judgment when addressing the question(s) below.    The Medical record reflects the following:    Supporting Clinical Findings Location in Medical Record     Pulmonary edema     lasix Po    Cardiomegaly and mild bilateral pulmonary edema/CHF pattern   PN 5/19    MAR 5/20    Xray 5/18                                                                                      Doctor, Please specify diagnosis or diagnoses associated with above clinical findings.    Please further specify the acuity of Pulmonary Edema if you are able to determine:    Provider Use Only    [  ] Acute Pulmonary Edema  [ x ] Chronic Pulmonary Edema  [  ] Clinically unable to determine  [  ] Other explanations with details________________________________________

## 2018-05-22 NOTE — PLAN OF CARE
05/22/18 0834   Final Note   Assessment Type Final Discharge Note   Discharge Disposition SNF

## 2018-05-31 ENCOUNTER — PATIENT OUTREACH (OUTPATIENT)
Dept: ADMINISTRATIVE | Facility: CLINIC | Age: 83
End: 2018-05-31

## 2018-06-20 ENCOUNTER — PATIENT OUTREACH (OUTPATIENT)
Dept: ADMINISTRATIVE | Facility: CLINIC | Age: 83
End: 2018-06-20

## 2018-06-20 RX ORDER — DOCUSATE SODIUM 100 MG/1
100 CAPSULE, LIQUID FILLED ORAL DAILY
COMMUNITY

## 2023-12-01 NOTE — PLAN OF CARE
Problem: Patient Care Overview  Goal: Plan of Care Review  Outcome: Ongoing (interventions implemented as appropriate)  Patient alert and oriented to self and time, VSS.  Pt confused throughout shift.  Pt awake during most of shift.  No complaints of pain or n/v.  Pt verbalized understanding of POC.  Purposeful rounding done during shift to promote patient safety. Patient free from falls and injury during shift.  Non skid socks on when OOB.  Bed in lowest position, brakes locked, and call light within reach.  Will continue to monitor.         0 (no pain/absence of nonverbal indicators of pain)